# Patient Record
Sex: FEMALE | Race: WHITE | URBAN - METROPOLITAN AREA
[De-identification: names, ages, dates, MRNs, and addresses within clinical notes are randomized per-mention and may not be internally consistent; named-entity substitution may affect disease eponyms.]

---

## 2023-01-13 ENCOUNTER — HOSPITAL ENCOUNTER (OUTPATIENT)
Dept: PREADMISSION TESTING | Age: 83
Discharge: HOME | End: 2023-01-13
Payer: COMMERCIAL

## 2023-01-13 VITALS
OXYGEN SATURATION: 97 % | RESPIRATION RATE: 18 BRPM | DIASTOLIC BLOOD PRESSURE: 67 MMHG | BODY MASS INDEX: 23.3 KG/M2 | HEART RATE: 77 BPM | WEIGHT: 145 LBS | TEMPERATURE: 97.8 F | HEIGHT: 66 IN | SYSTOLIC BLOOD PRESSURE: 139 MMHG

## 2023-01-13 LAB
ABSOLUTE EOS #: 0.1 K/UL (ref 0–0.44)
ABSOLUTE IMMATURE GRANULOCYTE: 0.02 K/UL (ref 0–0.3)
ABSOLUTE LYMPH #: 1.61 K/UL (ref 1.1–3.7)
ABSOLUTE MONO #: 0.55 K/UL (ref 0.1–1.2)
ANION GAP SERPL CALCULATED.3IONS-SCNC: 11 MMOL/L (ref 9–17)
BASOPHILS # BLD: 1 % (ref 0–2)
BASOPHILS ABSOLUTE: 0.05 K/UL (ref 0–0.2)
BILIRUBIN URINE: NEGATIVE
BUN BLDV-MCNC: 20 MG/DL (ref 8–23)
BUN/CREAT BLD: 23 (ref 9–20)
CALCIUM SERPL-MCNC: 9.6 MG/DL (ref 8.6–10.4)
CHLORIDE BLD-SCNC: 104 MMOL/L (ref 98–107)
CO2: 26 MMOL/L (ref 20–31)
COLOR: YELLOW
COMMENT UA: NORMAL
CREAT SERPL-MCNC: 0.88 MG/DL (ref 0.5–0.9)
EOSINOPHILS RELATIVE PERCENT: 1 % (ref 1–4)
GFR SERPL CREATININE-BSD FRML MDRD: >60 ML/MIN/1.73M2
GLUCOSE BLD-MCNC: 86 MG/DL (ref 70–99)
GLUCOSE URINE: NEGATIVE
HCT VFR BLD CALC: 43.4 % (ref 36.3–47.1)
HEMOGLOBIN: 13.6 G/DL (ref 11.9–15.1)
IMMATURE GRANULOCYTES: 0 %
INR BLD: 1
KETONES, URINE: NEGATIVE
LEUKOCYTE ESTERASE, URINE: NEGATIVE
LYMPHOCYTES # BLD: 22 % (ref 24–43)
MCH RBC QN AUTO: 33.7 PG (ref 25.2–33.5)
MCHC RBC AUTO-ENTMCNC: 31.3 G/DL (ref 28.4–34.8)
MCV RBC AUTO: 107.7 FL (ref 82.6–102.9)
MONOCYTES # BLD: 8 % (ref 3–12)
NITRITE, URINE: NEGATIVE
NRBC AUTOMATED: 0 PER 100 WBC
PARTIAL THROMBOPLASTIN TIME: 26.7 SEC (ref 23.9–33.8)
PDW BLD-RTO: 13.7 % (ref 11.8–14.4)
PH UA: 6 (ref 5–8)
PLATELET # BLD: 198 K/UL (ref 138–453)
PMV BLD AUTO: 10.2 FL (ref 8.1–13.5)
POTASSIUM SERPL-SCNC: 3.9 MMOL/L (ref 3.7–5.3)
PROTEIN UA: NEGATIVE
PROTHROMBIN TIME: 12.7 SEC (ref 11.5–14.2)
RBC # BLD: 4.03 M/UL (ref 3.95–5.11)
RBC # BLD: ABNORMAL 10*6/UL
SEG NEUTROPHILS: 68 % (ref 36–65)
SEGMENTED NEUTROPHILS ABSOLUTE COUNT: 4.94 K/UL (ref 1.5–8.1)
SODIUM BLD-SCNC: 141 MMOL/L (ref 135–144)
SPECIFIC GRAVITY UA: 1.02 (ref 1–1.03)
TURBIDITY: CLEAR
URINE HGB: NEGATIVE
UROBILINOGEN, URINE: NORMAL
WBC # BLD: 7.3 K/UL (ref 3.5–11.3)

## 2023-01-13 PROCEDURE — 80048 BASIC METABOLIC PNL TOTAL CA: CPT

## 2023-01-13 PROCEDURE — 81003 URINALYSIS AUTO W/O SCOPE: CPT

## 2023-01-13 PROCEDURE — 85730 THROMBOPLASTIN TIME PARTIAL: CPT

## 2023-01-13 PROCEDURE — 87086 URINE CULTURE/COLONY COUNT: CPT

## 2023-01-13 PROCEDURE — 85610 PROTHROMBIN TIME: CPT

## 2023-01-13 PROCEDURE — 85025 COMPLETE CBC W/AUTO DIFF WBC: CPT

## 2023-01-13 PROCEDURE — 36415 COLL VENOUS BLD VENIPUNCTURE: CPT

## 2023-01-13 RX ORDER — FLUTICASONE FUROATE AND VILANTEROL 100; 25 UG/1; UG/1
1 POWDER RESPIRATORY (INHALATION) DAILY
COMMUNITY
Start: 2020-06-03

## 2023-01-13 RX ORDER — MONTELUKAST SODIUM 10 MG/1
10 TABLET ORAL NIGHTLY
COMMUNITY
Start: 2021-05-19

## 2023-01-13 RX ORDER — ASPIRIN 81 MG/1
81 TABLET ORAL DAILY
COMMUNITY
Start: 2023-01-04

## 2023-01-13 RX ORDER — ACETAMINOPHEN 500 MG
500 TABLET ORAL EVERY 6 HOURS PRN
COMMUNITY

## 2023-01-13 RX ORDER — FOLIC ACID 1 MG/1
1 TABLET ORAL DAILY
COMMUNITY

## 2023-01-13 RX ORDER — ATORVASTATIN CALCIUM 10 MG/1
10 TABLET, FILM COATED ORAL DAILY
COMMUNITY
Start: 2023-01-04

## 2023-01-13 RX ORDER — FLUTICASONE PROPIONATE 50 MCG
2 SPRAY, SUSPENSION (ML) NASAL DAILY
COMMUNITY
Start: 2022-11-07

## 2023-01-13 RX ORDER — ALBUTEROL SULFATE 90 UG/1
2 AEROSOL, METERED RESPIRATORY (INHALATION) EVERY 4 HOURS PRN
COMMUNITY

## 2023-01-13 RX ORDER — HYDROXYCHLOROQUINE SULFATE 200 MG/1
200 TABLET, FILM COATED ORAL DAILY
COMMUNITY
Start: 2020-01-27

## 2023-01-13 RX ORDER — SERTRALINE HYDROCHLORIDE 100 MG/1
100 TABLET, FILM COATED ORAL DAILY
COMMUNITY
Start: 2020-05-19

## 2023-01-13 RX ORDER — METOPROLOL SUCCINATE 25 MG/1
25 TABLET, EXTENDED RELEASE ORAL DAILY
COMMUNITY

## 2023-01-13 ASSESSMENT — PAIN DESCRIPTION - LOCATION: LOCATION: BACK

## 2023-01-13 ASSESSMENT — PAIN SCALES - GENERAL: PAINLEVEL_OUTOF10: 0

## 2023-01-13 ASSESSMENT — PAIN DESCRIPTION - FREQUENCY: FREQUENCY: INTERMITTENT

## 2023-01-13 ASSESSMENT — PAIN DESCRIPTION - DESCRIPTORS: DESCRIPTORS: ACHING

## 2023-01-13 ASSESSMENT — PAIN DESCRIPTION - PAIN TYPE: TYPE: CHRONIC PAIN

## 2023-01-13 NOTE — PROGRESS NOTES
PAT Progress Note    Pt Name: Karina Sanchez  MRN: [id number]  YOB: 1940  Date of evaluation: 1/13/2023      [x] Called to [facility]. I spoke to the patient, Karina Sanchez, a 78 y.o. female, who is scheduled for an upcoming L 3-5 [address] L3-4 by Coni Alarcon MD for Spinal stenosis of lumbar region, unspecified whether neurogenic claudication present [M48.061] on 2/3/2023 at 1005. [x] I reviewed in [facility] the primary care progress note by Dr. Amado Mt dated 1/9/2023 for an Interval History and Physical Note the day of surgery. History of hypertension, asthma, memory loss. Patient was treated for UTI in December 2022 and developed jaw and neck pain. She was admitted to [facility] and had stress test which was low risk and 2D echo. Patient had outpatient follow up with cardiology on 1/3/2023 - cardiology aware of upcoming surgery with Dr. Bishnu Stewart. Patient has known dilated ascending aorta and was recommended repeat imaging in 1 year. She was additionally evaluated by neurology for memory loss and primary care. Patient follows with pulmonary and evaluated 11/7/2022. Functional Capacity per pt:  1) Pt is able to walk 2 city blocks on level ground without SOB. 2) Pt is able to climb 2 flights of stairs without SOB. 3) Pt is able to walk up a hill for 1-2 city blocks without SOB. Echo complete W/ contrast 12/23/2022:    Left Ventricle: Left ventricle is small. There is mild increased wall thickness/hypertrophy. Systolic function is normal with an ejection fraction of 65-70%. No segmental wall motion abnormalities. Right Ventricle: Right ventricular size appears normal. Systolic function is normal.     Left Atrium: Left atrium is normal in size. The left atrial volume index is 26.0 mL/m2. Aorta: The ascending aorta is mildly dilated, measuring 4.1 cm. No significant valvular stenosis or regurgitation. Nuclear stress lexiscan 12/23/2022:     The Lexiscan ECG was negative for ischemia. Nuclear stress images reported separately   IMPRESSION:   Pattern is considered low risk for significant cardiovascular event   No large or suspicious reversible perfusion defects are seen   Nondilated left ventricle with ejection fraction 62%   Asymmetric nodular confluent soft tissue density in the inferior lateral aspect of the right greater than left breast measuring up to around 25 mm. Correlate for any corresponding mammographic or palpable abnormality. Diagnostic breast imaging or ultrasound might be useful. Vital signs: /67   Pulse 77   Temp 97.8 °F (36.6 °C) (Temporal)   Resp 18   Ht 5' 6\" (1.676 m)   Wt 145 lb (65.8 kg)   SpO2 97%   BMI 23.40 kg/m²     Physical Exam:     General Appearance:  Alert, well appearing, and in no acute distress. Mental status: Poor historian. Oriented to person, place, and time. Lungs:  Bilateral equal air entry, clear to auscultation, no wheezing, rales or rhonchi, and normal effort. Cardiovascular:  Normal rate, regular rhythm, no murmur, gallop, or rub. Investigations:      Laboratory Testing:  Recent Results (from the past 24 hour(s))   Urinalysis    Collection Time: 01/13/23 11:16 AM   Result Value Ref Range    Color, UA Yellow Yellow    Turbidity UA Clear Clear    Glucose, Ur NEGATIVE NEGATIVE    Bilirubin Urine NEGATIVE NEGATIVE    Ketones, Urine NEGATIVE NEGATIVE    Specific Gravity, UA 1.025 1.005 - 1.030    Urine Hgb NEGATIVE NEGATIVE    pH, UA 6.0 5.0 - 8.0    Protein, UA NEGATIVE NEGATIVE    Urobilinogen, Urine Normal Normal    Nitrite, Urine NEGATIVE NEGATIVE    Leukocyte Esterase, Urine NEGATIVE NEGATIVE    Urinalysis Comments       Microscopic exam not performed based on chemical results unless requested in original order.    CBC with Auto Differential    Collection Time: 01/13/23 11:18 AM   Result Value Ref Range    WBC 7.3 3.5 - 11.3 k/uL    RBC 4.03 3.95 - 5.11 m/uL    Hemoglobin 13.6 11.9 - 15.1 g/dL    Hematocrit 43.4 36.3 - 47.1 %    .7 (H) 82.6 - 102.9 fL    MCH 33.7 (H) 25.2 - 33.5 pg    MCHC 31.3 28.4 - 34.8 g/dL    RDW 13.7 11.8 - 14.4 %    Platelets 263 256 - 665 k/uL    MPV 10.2 8.1 - 13.5 fL    NRBC Automated 0.0 0.0 per 100 WBC    Seg Neutrophils 68 (H) 36 - 65 %    Lymphocytes 22 (L) 24 - 43 %    Monocytes 8 3 - 12 %    Eosinophils % 1 1 - 4 %    Basophils 1 0 - 2 %    Immature Granulocytes 0 0 %    Segs Absolute 4.94 1.50 - 8.10 k/uL    Absolute Lymph # 1.61 1.10 - 3.70 k/uL    Absolute Mono # 0.55 0.10 - 1.20 k/uL    Absolute Eos # 0.10 0.00 - 0.44 k/uL    Basophils Absolute 0.05 0.00 - 0.20 k/uL    Absolute Immature Granulocyte 0.02 0.00 - 0.30 k/uL    RBC Morphology MACROCYTOSIS PRESENT    Protime-INR    Collection Time: 01/13/23 11:18 AM   Result Value Ref Range    Protime 12.7 11.5 - 14.2 sec    INR 1.0    APTT    Collection Time: 01/13/23 11:18 AM   Result Value Ref Range    PTT 26.7 23.9 - 33.8 sec   Basic Metabolic Panel    Collection Time: 01/13/23 11:18 AM   Result Value Ref Range    Glucose 86 70 - 99 mg/dL    BUN 20 8 - 23 mg/dL    Creatinine 0.88 0.50 - 0.90 mg/dL    Est, Glom Filt Rate >60 >60 mL/min/1.73m2    Bun/Cre Ratio 23 (H) 9 - 20    Calcium 9.6 8.6 - 10.4 mg/dL    Sodium 141 135 - 144 mmol/L    Potassium 3.9 3.7 - 5.3 mmol/L    Chloride 104 98 - 107 mmol/L    CO2 26 20 - 31 mmol/L    Anion Gap 11 9 - 17 mmol/L       Recent Labs     01/13/23  1118   HGB 13.6   HCT 43.4   WBC 7.3   .7*      K 3.9      CO2 26   BUN 20   CREATININE 0.88   GLUCOSE 86   INR 1.0   PROTIME 12.7   APTT 26.7       No results for input(s): COVID19 in the last 720 hours.     EMANUEL San - CNP    Electronically signed 1/13/2023 at 12:24 PM

## 2023-01-13 NOTE — PRE-PROCEDURE INSTRUCTIONS
[facility] ON Friday, February 3  at 0800 AM    Once you enter the hospital lobby, take the elevators to the second floor. Check-In is at the surgery registration desk. Continue to take your home medications as you normally do up to and including the night before surgery with the exception of any blood thinning medications. Please stop any blood thinning medications as directed by your surgeon or prescribing physician. Failure to stop certain medications may interfere with your scheduled surgery. These may include:  Aspirin, Warfarin (Coumadin), Clopidogrel (Plavix), Ibuprofen (Motrin, Advil), Naproxen (Aleve), Meloxicam (Mobic), Celecoxib (Celebrex), Eliquis, Pradaxa, Xarelto, Effient, Fish Oil, Herbal supplements. Stop aspirin as directed by physician 5 days prior to surgery,  Stop methotrexate 1/24,     Please take the following medication(s) the day of surgery with a small sip of water:  Breo inhaler, sertraline, metorprolol,     Please use your inhaler(s) if needed and bring your inhaler(s) from home the day of surgery. PREPARING FOR YOUR SURGERY:     Before surgery, you can play an important role in your own health. Because skin is not sterile, we need to be sure that your skin is as free of germs as possible before surgery by carefully washing before surgery. Preparing or prepping skin before surgery can reduce the risk of a surgical site infection.   Do not shave the area of your body where your surgery will be performed unless you received specific permission from your physician. You will need to shower at home the night before surgery and the morning of surgery with a special soap called chlorhexidine gluconate (CHG*). *Not to be used by people allergic to Chlorhexidine Gluconate (CHG). Following these instructions will help you be sure that your skin is clean before surgery. Instructions on cleaning your skin before surgery:      The night before your surgery: You will need to shower with warm water (not hot) and the CHG soap. Use a clean wash cloth and a clean towel. Have clean clothes available to put on after the shower. First wash your hair with regular shampoo. Rinse your hair and body thoroughly to remove the shampoo. Wash your face and genital area (private parts) with your regular soap or water only. Thoroughly rinse your body with warm water from the neck down. Turn water off to prevent rinsing the soap off too soon. With a clean wet washcloth and half of the CHG soap in the bottle, lather your entire body from the neck down. Do not use CHG soap near your eyes or ears to avoid injury to those areas. Wash thoroughly, paying special attention to the area where your surgery will be performed. Wash your body gently for five (5) minutes. Avoid scrubbing your skin too hard. Turn the water back on and rinse your body thoroughly. Pat yourself dry with a clean, soft towel. Do not apply lotion, cream or powder. Dress with clean freshly washed clothes. The morning of surgery:    Repeat shower following steps above - using remaining half of CHG soap in bottle. Patient Instructions: If you are having any type of anesthesia you are to have nothing to eat or drink after midnight the night before your surgery. This includes gum, hard candy, mints, water or smoking or chewing tobacco.  The only exception to this is a small sip of water to take with any morning dose of heart, blood pressure, or seizure medications. No alcoholic beverages for 24 hours prior to surgery. Brush your teeth but do not swallow water. Bring your eye glasses and case with you. No contacts are to be worn the day of surgery. You also may bring your hearing aids. Most surgical procedures involving anesthesia will require that you remove your dentures prior to surgery. Do not wear any jewelry or body piercings day of surgery.   Also, NO lotion, perfume or deodorant to be used the day of surgery. No nail polish on the operative extremity (arm/leg surgeries)    If you are staying overnight with us, please bring a small bag of necessary personal items. Please wear loose, comfortable clothing. If you are potentially going to have a cast or brace bring clothing that will fit over them. In case of illness - If you have cold or flu like symptoms (high fever, runny nose, sore throat, cough, etc.) rash, nausea, vomiting, loose stools, and/or recent contact with someone who has a contagious disease (chicken pox, measles, etc.) Please call your doctor before coming to the hospital.         Day of Surgery/Procedure:    As a patient at [facility] you can expect quality medical and nursing care that is centered on your individual needs. Our goal is to make your surgical experience as comfortable as possible    . Transportation After Your Surgery/Procedure: You will need a friend or family member to drive you home after your procedure. Your  must be 25years of age or older and able to sign off on your discharge instructions. A taxi cab or any other form of public transportation is not acceptable. Your friend or family member must stay at the hospital throughout your procedure. Someone must remain with you for the first 24 hours after your surgery if you receive anesthesia or medication. If you do not have someone to stay with you, your procedure may be cancelled.       If you have any other questions regarding your procedure or the day of surgery, please call [phone]      _________________________  ____________________________  Signature (Patient)              Signature (Provider)               Date

## 2023-01-14 LAB
CULTURE: NORMAL
SPECIMEN DESCRIPTION: NORMAL

## 2023-01-16 ENCOUNTER — ANESTHESIA EVENT (OUTPATIENT)
Dept: OPERATING ROOM | Age: 83
End: 2023-01-16
Payer: MEDICARE

## 2023-01-16 RX ORDER — CLINDAMYCIN PHOSPHATE 900 MG/50ML
900 INJECTION INTRAVENOUS ONCE
OUTPATIENT
Start: 2023-02-03

## 2023-02-02 NOTE — DISCHARGE INSTRUCTIONS
No bending or twisting back for 6 weeks  No lifting over 15 pounds for 6 weeks  Dry dressing changes daily x 1wk. Start in 2 days  No NSAIDS x 5 days  Stiches are dissolvable and do not need to be removed  Call for any fevers, wound redness, swelling or drainage after 4 days [phone]  May shower in 2 days  No tub baths for 6 weeks  Resume methotrexate in 2 weeks    Chon Robert MD  [facility]  Spine Surgeon       Keep it Clean - Post-Operative Home instructions    These instructions are to help you have the best possible recovery after your surgical procedure. Franko De La Garza is here to support you. If you have questions, call [phone] Monday through Friday from 7:30AM to 8:30PM to speak to a nurse. If you need to speak to someone outside of these hours, call your physician. Incision Dos and Donts  Do wash hands before and after dressing changes or when you have had any contact with your incision. Use hand  or antibacterial soap. Do keep your incision clean and dry. Its OK to wash the skin around your incision with mild soap and water. Do change your dressing as you were told. Do notify your doctor if the dressing becomes wet or dirty. Do use a clean washcloth every time when cleaning your incision. Do sleep on clean linens. Do keep pets away from incision site. Dont sit in a bathtub, pool, or hot tub until your incision is fully closed and any drains are removed. Dont scrub, pick, scratch, or pull at your incision. Dont use oils, lotions, or creams on your incision unless your healthcare provider approves it. Follow-up  You will have one or more follow-up visits with your healthcare provider. These are needed to check how well youre healing. Your drain, stitches, or staples may also be removed during these visits. Do not miss your follow-up visit, even if you are feeling better.       Call your healthcare provider right away if you have the following:  Fever of 100.4°F (38°C) or higher, or as advised by your healthcare provider. Chest pain or trouble breathing. Pain or tenderness in your leg(s). Increased pain, redness, swelling, bleeding, or foul-smelling drainage at the incision site. Incision changes, separates or is hot to the touch. Problems with the drain if you have one. Itchy, swollen skin; skin rash.   Daily dressing change for 1 week; begin 2-6-23  You may shower starting 2-6-23; no bath or swimming     Medicines, Diet, and Activity:   Refer to your discharge paperwork for further instructions

## 2023-02-03 ENCOUNTER — HOSPITAL ENCOUNTER (OUTPATIENT)
Age: 83
Discharge: HOME | End: 2023-02-04
Attending: ORTHOPAEDIC SURGERY | Admitting: ORTHOPAEDIC SURGERY
Payer: MEDICARE

## 2023-02-03 ENCOUNTER — ANESTHESIA (OUTPATIENT)
Dept: OPERATING ROOM | Age: 83
End: 2023-02-03
Payer: MEDICARE

## 2023-02-03 ENCOUNTER — APPOINTMENT (OUTPATIENT)
Dept: GENERAL RADIOLOGY | Age: 83
End: 2023-02-03
Attending: ORTHOPAEDIC SURGERY
Payer: MEDICARE

## 2023-02-03 DIAGNOSIS — G89.4 CHRONIC PAIN SYNDROME: ICD-10-CM

## 2023-02-03 DIAGNOSIS — M48.062 LUMBAR STENOSIS WITH NEUROGENIC CLAUDICATION: Primary | Chronic | ICD-10-CM

## 2023-02-03 PROBLEM — N18.9 CKD (CHRONIC KIDNEY DISEASE): Status: ACTIVE | Noted: 2023-02-03

## 2023-02-03 PROBLEM — I10 HYPERTENSION: Status: ACTIVE | Noted: 2023-02-03

## 2023-02-03 PROBLEM — J44.9 COPD (CHRONIC OBSTRUCTIVE PULMONARY DISEASE) (HCC): Status: ACTIVE | Noted: 2023-02-03

## 2023-02-03 PROBLEM — Z96.9 RETAINED ORTHOPEDIC HARDWARE: Chronic | Status: ACTIVE | Noted: 2023-02-03

## 2023-02-03 PROBLEM — E78.5 HYPERLIPIDEMIA: Status: ACTIVE | Noted: 2023-02-03

## 2023-02-03 PROCEDURE — 6360000002 HC RX W HCPCS: Performed by: ANESTHESIOLOGY

## 2023-02-03 PROCEDURE — 2500000003 HC RX 250 WO HCPCS: Performed by: NURSE ANESTHETIST, CERTIFIED REGISTERED

## 2023-02-03 PROCEDURE — 2580000003 HC RX 258: Performed by: ORTHOPAEDIC SURGERY

## 2023-02-03 PROCEDURE — 2720000010 HC SURG SUPPLY STERILE: Performed by: ORTHOPAEDIC SURGERY

## 2023-02-03 PROCEDURE — 2709999900 HC NON-CHARGEABLE SUPPLY: Performed by: ORTHOPAEDIC SURGERY

## 2023-02-03 PROCEDURE — 6360000002 HC RX W HCPCS: Performed by: ORTHOPAEDIC SURGERY

## 2023-02-03 PROCEDURE — 6370000000 HC RX 637 (ALT 250 FOR IP): Performed by: ANESTHESIOLOGY

## 2023-02-03 PROCEDURE — 6360000002 HC RX W HCPCS: Performed by: NURSE ANESTHETIST, CERTIFIED REGISTERED

## 2023-02-03 PROCEDURE — 7100000001 HC PACU RECOVERY - ADDTL 15 MIN: Performed by: ORTHOPAEDIC SURGERY

## 2023-02-03 PROCEDURE — 6370000000 HC RX 637 (ALT 250 FOR IP): Performed by: ORTHOPAEDIC SURGERY

## 2023-02-03 PROCEDURE — 2500000003 HC RX 250 WO HCPCS: Performed by: ORTHOPAEDIC SURGERY

## 2023-02-03 PROCEDURE — 3209999900 FLUORO FOR SURGICAL PROCEDURES

## 2023-02-03 PROCEDURE — 99221 1ST HOSP IP/OBS SF/LOW 40: CPT | Performed by: NURSE PRACTITIONER

## 2023-02-03 PROCEDURE — 2580000003 HC RX 258: Performed by: ANESTHESIOLOGY

## 2023-02-03 PROCEDURE — A4217 STERILE WATER/SALINE, 500 ML: HCPCS | Performed by: ORTHOPAEDIC SURGERY

## 2023-02-03 PROCEDURE — 3600000012 HC SURGERY LEVEL 2 ADDTL 15MIN: Performed by: ORTHOPAEDIC SURGERY

## 2023-02-03 PROCEDURE — 3700000001 HC ADD 15 MINUTES (ANESTHESIA): Performed by: ORTHOPAEDIC SURGERY

## 2023-02-03 PROCEDURE — 7100000000 HC PACU RECOVERY - FIRST 15 MIN: Performed by: ORTHOPAEDIC SURGERY

## 2023-02-03 PROCEDURE — 3600000002 HC SURGERY LEVEL 2 BASE: Performed by: ORTHOPAEDIC SURGERY

## 2023-02-03 PROCEDURE — 3700000000 HC ANESTHESIA ATTENDED CARE: Performed by: ORTHOPAEDIC SURGERY

## 2023-02-03 RX ORDER — SODIUM CHLORIDE 9 MG/ML
INJECTION, SOLUTION INTRAVENOUS PRN
Status: DISCONTINUED | OUTPATIENT
Start: 2023-02-03 | End: 2023-02-03 | Stop reason: HOSPADM

## 2023-02-03 RX ORDER — CLINDAMYCIN PHOSPHATE 900 MG/50ML
900 INJECTION INTRAVENOUS ONCE
Status: COMPLETED | OUTPATIENT
Start: 2023-02-03 | End: 2023-02-03

## 2023-02-03 RX ORDER — DEXAMETHASONE SODIUM PHOSPHATE 10 MG/ML
6 INJECTION, SOLUTION INTRAMUSCULAR; INTRAVENOUS EVERY 8 HOURS
Status: COMPLETED | OUTPATIENT
Start: 2023-02-03 | End: 2023-02-04

## 2023-02-03 RX ORDER — BUPIVACAINE HYDROCHLORIDE AND EPINEPHRINE 5; 5 MG/ML; UG/ML
INJECTION, SOLUTION EPIDURAL; INTRACAUDAL; PERINEURAL PRN
Status: DISCONTINUED | OUTPATIENT
Start: 2023-02-03 | End: 2023-02-03 | Stop reason: ALTCHOICE

## 2023-02-03 RX ORDER — ONDANSETRON 2 MG/ML
INJECTION INTRAMUSCULAR; INTRAVENOUS PRN
Status: DISCONTINUED | OUTPATIENT
Start: 2023-02-03 | End: 2023-02-03 | Stop reason: SDUPTHER

## 2023-02-03 RX ORDER — FENTANYL CITRATE 50 UG/ML
INJECTION, SOLUTION INTRAMUSCULAR; INTRAVENOUS PRN
Status: DISCONTINUED | OUTPATIENT
Start: 2023-02-03 | End: 2023-02-03 | Stop reason: SDUPTHER

## 2023-02-03 RX ORDER — GLYCOPYRROLATE 0.2 MG/ML
INJECTION INTRAMUSCULAR; INTRAVENOUS PRN
Status: DISCONTINUED | OUTPATIENT
Start: 2023-02-03 | End: 2023-02-03 | Stop reason: SDUPTHER

## 2023-02-03 RX ORDER — HYDROCODONE BITARTRATE AND ACETAMINOPHEN 5; 325 MG/1; MG/1
2 TABLET ORAL EVERY 4 HOURS PRN
Status: DISCONTINUED | OUTPATIENT
Start: 2023-02-03 | End: 2023-02-04 | Stop reason: HOSPADM

## 2023-02-03 RX ORDER — MORPHINE SULFATE 4 MG/ML
4 INJECTION, SOLUTION INTRAMUSCULAR; INTRAVENOUS
Status: DISCONTINUED | OUTPATIENT
Start: 2023-02-03 | End: 2023-02-04 | Stop reason: HOSPADM

## 2023-02-03 RX ORDER — ROCURONIUM BROMIDE 10 MG/ML
INJECTION, SOLUTION INTRAVENOUS PRN
Status: DISCONTINUED | OUTPATIENT
Start: 2023-02-03 | End: 2023-02-03 | Stop reason: SDUPTHER

## 2023-02-03 RX ORDER — FENTANYL CITRATE 50 UG/ML
25 INJECTION, SOLUTION INTRAMUSCULAR; INTRAVENOUS EVERY 5 MIN PRN
Status: DISCONTINUED | OUTPATIENT
Start: 2023-02-03 | End: 2023-02-03 | Stop reason: HOSPADM

## 2023-02-03 RX ORDER — SODIUM CHLORIDE 0.9 % (FLUSH) 0.9 %
5-40 SYRINGE (ML) INJECTION PRN
Status: DISCONTINUED | OUTPATIENT
Start: 2023-02-03 | End: 2023-02-03 | Stop reason: HOSPADM

## 2023-02-03 RX ORDER — METOPROLOL SUCCINATE 25 MG/1
25 TABLET, EXTENDED RELEASE ORAL DAILY
Status: DISCONTINUED | OUTPATIENT
Start: 2023-02-03 | End: 2023-02-03 | Stop reason: CLARIF

## 2023-02-03 RX ORDER — HYDROXYCHLOROQUINE SULFATE 200 MG/1
200 TABLET, FILM COATED ORAL DAILY
Status: DISCONTINUED | OUTPATIENT
Start: 2023-02-03 | End: 2023-02-04 | Stop reason: HOSPADM

## 2023-02-03 RX ORDER — SODIUM CHLORIDE 9 MG/ML
INJECTION, SOLUTION INTRAVENOUS CONTINUOUS
Status: DISCONTINUED | OUTPATIENT
Start: 2023-02-03 | End: 2023-02-04 | Stop reason: HOSPADM

## 2023-02-03 RX ORDER — FLUTICASONE PROPIONATE 50 MCG
2 SPRAY, SUSPENSION (ML) NASAL DAILY
Status: DISCONTINUED | OUTPATIENT
Start: 2023-02-03 | End: 2023-02-04 | Stop reason: HOSPADM

## 2023-02-03 RX ORDER — FLUTICASONE FUROATE AND VILANTEROL 100; 25 UG/1; UG/1
1 POWDER RESPIRATORY (INHALATION) DAILY
Status: DISCONTINUED | OUTPATIENT
Start: 2023-02-03 | End: 2023-02-04 | Stop reason: HOSPADM

## 2023-02-03 RX ORDER — LIDOCAINE HYDROCHLORIDE 10 MG/ML
1 INJECTION, SOLUTION EPIDURAL; INFILTRATION; INTRACAUDAL; PERINEURAL
Status: DISCONTINUED | OUTPATIENT
Start: 2023-02-03 | End: 2023-02-03 | Stop reason: HOSPADM

## 2023-02-03 RX ORDER — ATORVASTATIN CALCIUM 10 MG/1
10 TABLET, FILM COATED ORAL DAILY
Status: DISCONTINUED | OUTPATIENT
Start: 2023-02-03 | End: 2023-02-04 | Stop reason: HOSPADM

## 2023-02-03 RX ORDER — MONTELUKAST SODIUM 10 MG/1
10 TABLET ORAL NIGHTLY
Status: DISCONTINUED | OUTPATIENT
Start: 2023-02-03 | End: 2023-02-04 | Stop reason: HOSPADM

## 2023-02-03 RX ORDER — SENNA AND DOCUSATE SODIUM 50; 8.6 MG/1; MG/1
1 TABLET, FILM COATED ORAL 2 TIMES DAILY
Status: DISCONTINUED | OUTPATIENT
Start: 2023-02-03 | End: 2023-02-04 | Stop reason: HOSPADM

## 2023-02-03 RX ORDER — ONDANSETRON 2 MG/ML
4 INJECTION INTRAMUSCULAR; INTRAVENOUS EVERY 6 HOURS PRN
Status: DISCONTINUED | OUTPATIENT
Start: 2023-02-03 | End: 2023-02-04

## 2023-02-03 RX ORDER — DEXAMETHASONE SODIUM PHOSPHATE 10 MG/ML
INJECTION, SOLUTION INTRAMUSCULAR; INTRAVENOUS PRN
Status: DISCONTINUED | OUTPATIENT
Start: 2023-02-03 | End: 2023-02-03 | Stop reason: SDUPTHER

## 2023-02-03 RX ORDER — SODIUM CHLORIDE 0.9 % (FLUSH) 0.9 %
5-40 SYRINGE (ML) INJECTION EVERY 12 HOURS SCHEDULED
Status: DISCONTINUED | OUTPATIENT
Start: 2023-02-03 | End: 2023-02-04 | Stop reason: HOSPADM

## 2023-02-03 RX ORDER — POLYETHYLENE GLYCOL 3350 17 G/17G
17 POWDER, FOR SOLUTION ORAL DAILY
Status: DISCONTINUED | OUTPATIENT
Start: 2023-02-03 | End: 2023-02-04 | Stop reason: HOSPADM

## 2023-02-03 RX ORDER — LIDOCAINE HYDROCHLORIDE 20 MG/ML
INJECTION, SOLUTION EPIDURAL; INFILTRATION; INTRACAUDAL; PERINEURAL PRN
Status: DISCONTINUED | OUTPATIENT
Start: 2023-02-03 | End: 2023-02-03 | Stop reason: SDUPTHER

## 2023-02-03 RX ORDER — HYDROCODONE BITARTRATE AND ACETAMINOPHEN 5; 325 MG/1; MG/1
1 TABLET ORAL EVERY 4 HOURS PRN
Status: DISCONTINUED | OUTPATIENT
Start: 2023-02-03 | End: 2023-02-04 | Stop reason: HOSPADM

## 2023-02-03 RX ORDER — SODIUM CHLORIDE 0.9 % (FLUSH) 0.9 %
5-40 SYRINGE (ML) INJECTION EVERY 12 HOURS SCHEDULED
Status: DISCONTINUED | OUTPATIENT
Start: 2023-02-03 | End: 2023-02-03 | Stop reason: HOSPADM

## 2023-02-03 RX ORDER — SODIUM CHLORIDE 0.9 % (FLUSH) 0.9 %
5-40 SYRINGE (ML) INJECTION PRN
Status: DISCONTINUED | OUTPATIENT
Start: 2023-02-03 | End: 2023-02-04 | Stop reason: HOSPADM

## 2023-02-03 RX ORDER — PROPOFOL 10 MG/ML
INJECTION, EMULSION INTRAVENOUS PRN
Status: DISCONTINUED | OUTPATIENT
Start: 2023-02-03 | End: 2023-02-03 | Stop reason: SDUPTHER

## 2023-02-03 RX ORDER — ALBUTEROL SULFATE 90 UG/1
2 AEROSOL, METERED RESPIRATORY (INHALATION) EVERY 4 HOURS PRN
Status: DISCONTINUED | OUTPATIENT
Start: 2023-02-03 | End: 2023-02-04 | Stop reason: HOSPADM

## 2023-02-03 RX ORDER — METHOCARBAMOL 750 MG/1
750 TABLET, FILM COATED ORAL EVERY 8 HOURS PRN
Status: DISCONTINUED | OUTPATIENT
Start: 2023-02-03 | End: 2023-02-04 | Stop reason: HOSPADM

## 2023-02-03 RX ORDER — MORPHINE SULFATE 2 MG/ML
2 INJECTION, SOLUTION INTRAMUSCULAR; INTRAVENOUS
Status: DISCONTINUED | OUTPATIENT
Start: 2023-02-03 | End: 2023-02-04 | Stop reason: HOSPADM

## 2023-02-03 RX ORDER — HYDROXYZINE HYDROCHLORIDE 10 MG/1
10 TABLET, FILM COATED ORAL EVERY 8 HOURS PRN
Status: DISCONTINUED | OUTPATIENT
Start: 2023-02-03 | End: 2023-02-04 | Stop reason: HOSPADM

## 2023-02-03 RX ORDER — METOPROLOL SUCCINATE 50 MG/1
50 TABLET, EXTENDED RELEASE ORAL NIGHTLY
COMMUNITY

## 2023-02-03 RX ORDER — HYDROMORPHONE HYDROCHLORIDE 1 MG/ML
0.5 INJECTION, SOLUTION INTRAMUSCULAR; INTRAVENOUS; SUBCUTANEOUS EVERY 5 MIN PRN
Status: DISCONTINUED | OUTPATIENT
Start: 2023-02-03 | End: 2023-02-03 | Stop reason: HOSPADM

## 2023-02-03 RX ORDER — SODIUM CHLORIDE, SODIUM LACTATE, POTASSIUM CHLORIDE, CALCIUM CHLORIDE 600; 310; 30; 20 MG/100ML; MG/100ML; MG/100ML; MG/100ML
INJECTION, SOLUTION INTRAVENOUS CONTINUOUS
Status: DISCONTINUED | OUTPATIENT
Start: 2023-02-03 | End: 2023-02-03

## 2023-02-03 RX ORDER — PROMETHAZINE HYDROCHLORIDE 12.5 MG/1
12.5 TABLET ORAL EVERY 6 HOURS PRN
Status: DISCONTINUED | OUTPATIENT
Start: 2023-02-03 | End: 2023-02-04

## 2023-02-03 RX ORDER — SODIUM CHLORIDE 9 MG/ML
INJECTION, SOLUTION INTRAVENOUS PRN
Status: DISCONTINUED | OUTPATIENT
Start: 2023-02-03 | End: 2023-02-04 | Stop reason: HOSPADM

## 2023-02-03 RX ORDER — ONDANSETRON 2 MG/ML
4 INJECTION INTRAMUSCULAR; INTRAVENOUS
Status: DISCONTINUED | OUTPATIENT
Start: 2023-02-03 | End: 2023-02-03 | Stop reason: HOSPADM

## 2023-02-03 RX ORDER — SERTRALINE HYDROCHLORIDE 100 MG/1
100 TABLET, FILM COATED ORAL DAILY
Status: DISCONTINUED | OUTPATIENT
Start: 2023-02-03 | End: 2023-02-04 | Stop reason: HOSPADM

## 2023-02-03 RX ORDER — ACETAMINOPHEN 325 MG/1
650 TABLET ORAL ONCE
Status: COMPLETED | OUTPATIENT
Start: 2023-02-03 | End: 2023-02-03

## 2023-02-03 RX ORDER — SODIUM CHLORIDE 9 MG/ML
INJECTION, SOLUTION INTRAVENOUS CONTINUOUS
Status: DISCONTINUED | OUTPATIENT
Start: 2023-02-03 | End: 2023-02-03

## 2023-02-03 RX ORDER — METOPROLOL SUCCINATE 50 MG/1
50 TABLET, EXTENDED RELEASE ORAL DAILY
Status: DISCONTINUED | OUTPATIENT
Start: 2023-02-04 | End: 2023-02-04 | Stop reason: HOSPADM

## 2023-02-03 RX ORDER — PHENYLEPHRINE HCL IN 0.9% NACL 1 MG/10 ML
SYRINGE (ML) INTRAVENOUS PRN
Status: DISCONTINUED | OUTPATIENT
Start: 2023-02-03 | End: 2023-02-03 | Stop reason: SDUPTHER

## 2023-02-03 RX ADMIN — Medication 100 MCG: at 11:50

## 2023-02-03 RX ADMIN — FENTANYL CITRATE 25 MCG: 50 INJECTION INTRAMUSCULAR; INTRAVENOUS at 10:56

## 2023-02-03 RX ADMIN — Medication 100 MCG: at 11:22

## 2023-02-03 RX ADMIN — Medication 50 MCG: at 12:02

## 2023-02-03 RX ADMIN — ATORVASTATIN CALCIUM 10 MG: 10 TABLET, FILM COATED ORAL at 19:00

## 2023-02-03 RX ADMIN — LIDOCAINE HYDROCHLORIDE 50 MG: 20 INJECTION, SOLUTION EPIDURAL; INFILTRATION; INTRACAUDAL at 10:34

## 2023-02-03 RX ADMIN — HYDROCODONE BITARTRATE AND ACETAMINOPHEN 1 TABLET: 5; 325 TABLET ORAL at 16:42

## 2023-02-03 RX ADMIN — FENTANYL CITRATE 50 MCG: 50 INJECTION INTRAMUSCULAR; INTRAVENOUS at 10:34

## 2023-02-03 RX ADMIN — HYDROXYCHLOROQUINE SULFATE 200 MG: 200 TABLET, FILM COATED ORAL at 19:00

## 2023-02-03 RX ADMIN — DEXAMETHASONE SODIUM PHOSPHATE 6 MG: 10 INJECTION INTRAMUSCULAR; INTRAVENOUS at 19:05

## 2023-02-03 RX ADMIN — SODIUM CHLORIDE, POTASSIUM CHLORIDE, SODIUM LACTATE AND CALCIUM CHLORIDE: 600; 310; 30; 20 INJECTION, SOLUTION INTRAVENOUS at 12:24

## 2023-02-03 RX ADMIN — SENNOSIDES AND DOCUSATE SODIUM 1 TABLET: 50; 8.6 TABLET ORAL at 20:28

## 2023-02-03 RX ADMIN — FENTANYL CITRATE 25 MCG: 50 INJECTION, SOLUTION INTRAMUSCULAR; INTRAVENOUS at 13:38

## 2023-02-03 RX ADMIN — ROCURONIUM BROMIDE 10 MG: 10 SOLUTION INTRAVENOUS at 11:33

## 2023-02-03 RX ADMIN — MONTELUKAST SODIUM 10 MG: 10 TABLET ORAL at 20:28

## 2023-02-03 RX ADMIN — DEXAMETHASONE SODIUM PHOSPHATE 10 MG: 10 INJECTION, SOLUTION INTRAMUSCULAR; INTRAVENOUS at 10:53

## 2023-02-03 RX ADMIN — GLYCOPYRROLATE 0.3 MG: 0.2 INJECTION INTRAMUSCULAR; INTRAVENOUS at 11:19

## 2023-02-03 RX ADMIN — ROCURONIUM BROMIDE 40 MG: 10 SOLUTION INTRAVENOUS at 10:34

## 2023-02-03 RX ADMIN — CLINDAMYCIN PHOSPHATE 900 MG: 900 INJECTION, SOLUTION INTRAVENOUS at 10:51

## 2023-02-03 RX ADMIN — SUGAMMADEX 200 MG: 100 INJECTION, SOLUTION INTRAVENOUS at 12:39

## 2023-02-03 RX ADMIN — SODIUM CHLORIDE: 9 INJECTION, SOLUTION INTRAVENOUS at 16:12

## 2023-02-03 RX ADMIN — Medication 100 MCG: at 11:25

## 2023-02-03 RX ADMIN — SODIUM CHLORIDE, POTASSIUM CHLORIDE, SODIUM LACTATE AND CALCIUM CHLORIDE: 600; 310; 30; 20 INJECTION, SOLUTION INTRAVENOUS at 08:51

## 2023-02-03 RX ADMIN — POLYETHYLENE GLYCOL 3350 17 G: 17 POWDER, FOR SOLUTION ORAL at 19:05

## 2023-02-03 RX ADMIN — Medication 50 MCG: at 12:38

## 2023-02-03 RX ADMIN — ONDANSETRON 4 MG: 2 INJECTION INTRAMUSCULAR; INTRAVENOUS at 12:18

## 2023-02-03 RX ADMIN — HYDROCODONE BITARTRATE AND ACETAMINOPHEN 1 TABLET: 5; 325 TABLET ORAL at 20:28

## 2023-02-03 RX ADMIN — ACETAMINOPHEN 650 MG: 325 TABLET, FILM COATED ORAL at 09:53

## 2023-02-03 RX ADMIN — Medication 50 MCG: at 12:08

## 2023-02-03 RX ADMIN — PROPOFOL 100 MG: 10 INJECTION, EMULSION INTRAVENOUS at 10:34

## 2023-02-03 ASSESSMENT — PAIN SCALES - GENERAL
PAINLEVEL_OUTOF10: 0
PAINLEVEL_OUTOF10: 1
PAINLEVEL_OUTOF10: 4
PAINLEVEL_OUTOF10: 3
PAINLEVEL_OUTOF10: 4
PAINLEVEL_OUTOF10: 0

## 2023-02-03 ASSESSMENT — PAIN DESCRIPTION - PAIN TYPE
TYPE: SURGICAL PAIN
TYPE: SURGICAL PAIN

## 2023-02-03 ASSESSMENT — PAIN DESCRIPTION - LOCATION
LOCATION: BACK
LOCATION: BACK

## 2023-02-03 ASSESSMENT — PAIN DESCRIPTION - ORIENTATION
ORIENTATION: LOWER
ORIENTATION: MID;LOWER

## 2023-02-03 ASSESSMENT — PAIN DESCRIPTION - DESCRIPTORS: DESCRIPTORS: SORE

## 2023-02-03 ASSESSMENT — PAIN - FUNCTIONAL ASSESSMENT
PAIN_FUNCTIONAL_ASSESSMENT: 0-10
PAIN_FUNCTIONAL_ASSESSMENT: PREVENTS OR INTERFERES SOME ACTIVE ACTIVITIES AND ADLS

## 2023-02-03 ASSESSMENT — PAIN DESCRIPTION - FREQUENCY: FREQUENCY: CONTINUOUS

## 2023-02-03 ASSESSMENT — PAIN DESCRIPTION - ONSET: ONSET: ON-GOING

## 2023-02-03 NOTE — PROGRESS NOTES
Pt admitted to room 2003 from PACU. Oriented to room, call light and bed mechanics. Side rails up x2. Call light within reach. Orders reviewed.

## 2023-02-03 NOTE — ANESTHESIA PRE PROCEDURE
Department of Anesthesiology  Preprocedure Note       Name:  Diego Segundo   Age:  78 y.o.  :  1940                                          MRN:  [id number]         Date:  2/3/2023      Surgeon: Lazarus Gunn):  Luisa Palacios MD    Procedure: Procedure(s):  L 3-5 LUMBAR LAMINECTOMY   WITH REMOVAL OF VERTIFLEX L3-4    Medications prior to admission:   Prior to Admission medications    Medication Sig Start Date End Date Taking?  Authorizing Provider   acetaminophen (TYLENOL) 500 MG tablet Take 500 mg by mouth every 6 hours as needed  Patient not taking: Reported on 2/3/2023    Historical Provider, MD   albuterol sulfate HFA (PROVENTIL;VENTOLIN;PROAIR) 108 (90 Base) MCG/ACT inhaler Inhale 2 puffs into the lungs every 4 hours as needed    Historical Provider, MD   aspirin 81 MG EC tablet Take 81 mg by mouth daily 23   Historical Provider, MD   atorvastatin (LIPITOR) 10 MG tablet Take 10 mg by mouth daily 23   Historical Provider, MD   Fluticasone Furoate-Vilanterol 100-25 MCG/ACT AEPB Inhale 1 puff into the lungs daily 6/3/20   Historical Provider, MD   fluticasone (FLONASE) 50 MCG/ACT nasal spray 2 sprays by Nasal route daily 22   Historical Provider, MD   folic acid (FOLVITE) 1 MG tablet Take 1 mg by mouth daily    Historical Provider, MD   hydroxychloroquine (PLAQUENIL) 200 MG tablet Take 200 mg by mouth daily 20   Historical Provider, MD   methotrexate (RHEUMATREX) 2.5 MG chemo tablet Take 2.5 mg by mouth once a week 22   Historical Provider, MD   metoprolol succinate (TOPROL XL) 25 MG extended release tablet Take 25 mg by mouth daily    Historical Provider, MD   montelukast (SINGULAIR) 10 MG tablet Take 10 mg by mouth nightly 21   Historical Provider, MD   sertraline (ZOLOFT) 100 MG tablet Take 100 mg by mouth daily 20   Historical Provider, MD       Current medications:    Current Facility-Administered Medications   Medication Dose Route Frequency Provider Last Rate Last Admin    albuterol sulfate HFA (PROVENTIL;VENTOLIN;PROAIR) 108 (90 Base) MCG/ACT inhaler 2 puff  2 puff Inhalation Q4H PRN Ghanshyam You MD        atorvastatin (LIPITOR) tablet 10 mg  10 mg Oral Daily Ghanshyam You MD        fluticasone (FLONASE) 50 MCG/ACT nasal spray 2 spray  2 spray Nasal Daily Ghanshyam You MD        Fluticasone Furoate-Vilanterol 100-25 MCG/ACT AEPB 1 puff  1 puff Inhalation Daily Ghanshyam You MD        hydroxychloroquine (PLAQUENIL) tablet 200 mg  200 mg Oral Daily Ghanshyam You MD        metoprolol succinate (TOPROL XL) extended release tablet 25 mg  25 mg Oral Daily Ghanshyam You MD        montelukast (SINGULAIR) tablet 10 mg  10 mg Oral Nightly Ghanshyam You MD        sertraline (ZOLOFT) tablet 100 mg  100 mg Oral Daily Ghanshyam You MD        0.9 % sodium chloride infusion   IntraVENous Continuous Ghanshyam You  mL/hr at 02/03/23 1612 New Bag at 02/03/23 1612    sodium chloride flush 0.9 % injection 5-40 mL  5-40 mL IntraVENous 2 times per day Ghanshyam You MD        sodium chloride flush 0.9 % injection 5-40 mL  5-40 mL IntraVENous PRN Ghanshyam You MD        0.9 % sodium chloride infusion   IntraVENous PRN Ghanshyam You MD        morphine (PF) injection 2 mg  2 mg IntraVENous Q2H PRN Ghanshyam You MD        Or    morphine sulfate (PF) injection 4 mg  4 mg IntraVENous Q2H PRN Ghanshyam You MD        hydrOXYzine HCl (ATARAX) tablet 10 mg  10 mg Oral Q8H PRN Ghanshyam You MD        promethazine (PHENERGAN) tablet 12.5 mg  12.5 mg Oral Q6H PRN Ghanshyam You MD        Or    ondansetron (ZOFRAN) injection 4 mg  4 mg IntraVENous Q6H PRN Ghanshyam You MD        polyethylene glycol (GLYCOLAX) packet 17 g  17 g Oral Daily Ghanshyam You MD        sennosides-docusate sodium (SENOKOT-S) 8.6-50 MG tablet 1 tablet  1 tablet Oral BID Ghanshyam You MD        methocarbamol (ROBAXIN) tablet 750 mg  750 mg Oral Q8H PRN Ghanshyam You MD        HYDROcodone-acetaminophen (NORCO) 5-325 MG per tablet 1 tablet  1 tablet Oral Q4H PRN Wilma Hannon MD        HYDROcodone-acetaminophen (NORCO) 5-325 MG per tablet 2 tablet  2 tablet Oral Q4H PRN Wilma Hannon MD        dexamethasone (PF) (DECADRON) injection 6 mg  6 mg IntraVENous Q8H Wilma Hannon MD           Allergies:     Allergies   Allergen Reactions    Ciprofloxacin Other (See Comments)     Other reaction(s): tendon rupture  Other reaction(s): tendon rupture  Other reaction(s): Muscle Pain  Other reaction(s): tendon rupture achilles    Other reaction(s): Muscle Pain      Levofloxacin Other (See Comments)     Other reaction(s): tendon rupture  Other reaction(s): tendon rupture  Other reaction(s): tendon rupture achilles      Lisinopril Cough    Sulfamethoxazole-Trimethoprim Nausea And Vomiting    Cefazolin Hives     Other reaction(s): Hives         Problem List:    Patient Active Problem List   Diagnosis Code    Lumbar stenosis with neurogenic claudication M48.062    Retained orthopedic hardware Z96.9       Past Medical History:        Diagnosis Date    Arthritis     Cancer ([address]     skin removed    Chronic fatigue     CKD (chronic kidney disease)     COPD (chronic obstructive pulmonary disease) ([address]     DDD (degenerative disc disease), cervical     Fibromyalgia     Hyperlipidemia     Hypertension     Hypothyroid     no longer on medication    Insomnia     Memory loss     Prolonged emergence from general anesthesia     Rheumatoid arthritis ([address]     Spondylolisthesis     lumbar       Past Surgical History:        Procedure Laterality Date    CHOLECYSTECTOMY      COLOSTOMY      EYE SURGERY      cataracts    KNEE ARTHROPLASTY Bilateral     [place]    REVISION COLOSTOMY      TONSILLECTOMY         Social History:    Social History     Tobacco Use    Smoking status: Former     Types: Cigarettes     Quit date:      Years since quittin.1    Smokeless tobacco: Never   Substance Use Topics    Alcohol use: Yes     Comment: vodka on rocks 1 or 2 per day                                Counseling given: Not Answered      Vital Signs (Current):   Vitals:    02/03/23 1430 02/03/23 1500 02/03/23 1530 02/03/23 1557   BP: (!) 106/52 (!) 113/50 (!) 116/52 124/65   Pulse: 66 63 64 72   Resp: 11 13 11 18   Temp:   97.5 °F (36.4 °C) 98.1 °F (36.7 °C)   TempSrc:   Temporal Oral   SpO2: 92% 93% 90% 95%   Weight:                                                  BP Readings from Last 3 Encounters:   02/03/23 124/65   01/13/23 139/67       NPO Status: Time of last liquid consumption: 2300                        Time of last solid consumption: 1800                        Date of last liquid consumption: 02/02/23                        Date of last solid food consumption: 02/02/23    BMI:   Wt Readings from Last 3 Encounters:   02/03/23 145 lb (65.8 kg)   01/13/23 145 lb (65.8 kg)     Body mass index is 23.4 kg/m². CBC:   Lab Results   Component Value Date/Time    WBC 7.3 01/13/2023 11:18 AM    RBC 4.03 01/13/2023 11:18 AM    HGB 13.6 01/13/2023 11:18 AM    HCT 43.4 01/13/2023 11:18 AM    .7 01/13/2023 11:18 AM    RDW 13.7 01/13/2023 11:18 AM     01/13/2023 11:18 AM       CMP:   Lab Results   Component Value Date/Time     01/13/2023 11:18 AM    K 3.9 01/13/2023 11:18 AM     01/13/2023 11:18 AM    CO2 26 01/13/2023 11:18 AM    BUN 20 01/13/2023 11:18 AM    CREATININE 0.88 01/13/2023 11:18 AM    LABGLOM >60 01/13/2023 11:18 AM    GLUCOSE 86 01/13/2023 11:18 AM    CALCIUM 9.6 01/13/2023 11:18 AM       POC Tests: No results for input(s): POCGLU, POCNA, POCK, POCCL, POCBUN, POCHEMO, POCHCT in the last 72 hours.     Coags:   Lab Results   Component Value Date/Time    PROTIME 12.7 01/13/2023 11:18 AM    INR 1.0 01/13/2023 11:18 AM    APTT 26.7 01/13/2023 11:18 AM       HCG (If Applicable): No results found for: PREGTESTUR, PREGSERUM, HCG, HCGQUANT     ABGs: No results found for: PHART, PO2ART, GBI5KAZ, UBG8DDD, BEART, E8XNPBTI     Type & Screen (If Applicable):  No results found for: LABABO, LABRH    Drug/Infectious Status (If Applicable):  No results found for: HIV, HEPCAB    COVID-19 Screening (If Applicable): No results found for: COVID19        Anesthesia Evaluation  Patient summary reviewed and Nursing notes reviewed no history of anesthetic complications:   Airway: Mallampati: II  TM distance: >3 FB   Neck ROM: full  Mouth opening: > = 3 FB   Dental: normal exam         Pulmonary:   (+) COPD:                             Cardiovascular:  Exercise tolerance: no interval change,   (+) hypertension:,     (-) CAD and CABG/stent        Rate: normal                    Neuro/Psych:               GI/Hepatic/Renal:        (-) GERD       Endo/Other:    (+) hypothyroidism: arthritis:., .                 Abdominal:             Vascular: Other Findings:           Anesthesia Plan      general     ASA 3       Induction: intravenous. MIPS: Postoperative opioids intended and Prophylactic antiemetics administered. Anesthetic plan and risks discussed with patient. Plan discussed with CRNA.     Attending anesthesiologist reviewed and agrees with Preprocedure content                Lynette Ramirez DO   2/3/2023

## 2023-02-03 NOTE — H&P
Interval H&P Note    Pt Name: Sadia Watson  MRN: [id number]  YOB: 1940  Date of evaluation: 2/3/2023      [x] I have reviewed in [facility] the family medicine progress note by Dr. Moran Heading dated 01/09/2023, attached below for an Interval History and Physical note. [x] I have examined  Withams W Black  There are no changes to the patient who is scheduled for L 3-5 LUMBAR LAMINECTOMY   WITH REMOVAL OF VERTIFLEX L3-4 by Scarlet Porras MD for Spinal stenosis of lumbar region, unspecified whether neurogenic claudication present [M48.061]. The patient denies new health changes, fever, chills, wheezing, cough, increased SOB, chest pain, open sores or wounds. Denies hx of diabetes. Last aspirin 01/27/2023. Last Plaquenil 02/02/2023. Patient has history of hypertension as well as recent neck and jaw pain when hospitalized for a UTI in December 2022. She recently followed with cardiology on 01/03/2023. Imaging results below. Echo complete W/ contrast 12/23/2022:      Left Ventricle: Left ventricle is small. There is mild increased wall thickness/hypertrophy. Systolic function is normal with an ejection fraction of 65-70%. No segmental wall motion abnormalities. Right Ventricle: Right ventricular size appears normal. Systolic function is normal.     Left Atrium: Left atrium is normal in size. The left atrial volume index is 26.0 mL/m2. Aorta: The ascending aorta is mildly dilated, measuring 4.1 cm. No significant valvular stenosis or regurgitation. Nuclear stress lexiscan 12/23/2022: The Lexiscan ECG was negative for ischemia.      Nuclear stress images reported separately   IMPRESSION:   Pattern is considered low risk for significant cardiovascular event   No large or suspicious reversible perfusion defects are seen   Nondilated left ventricle with ejection fraction 62%   Asymmetric nodular confluent soft tissue density in the inferior lateral aspect of the right greater than left breast measuring up to around 25 mm. Correlate for any corresponding mammographic or palpable abnormality. Diagnostic breast imaging or ultrasound might be useful. Vital signs: /64   Pulse 70   Temp 97.5 °F (36.4 °C) (Temporal)   Resp 16   Wt 145 lb (65.8 kg)   SpO2 97%   BMI 23.40 kg/m²     Allergies:  Ciprofloxacin, Levofloxacin, Lisinopril, Sulfamethoxazole-trimethoprim, and Cefazolin    Medications:    Prior to Admission medications    Medication Sig Start Date End Date Taking?  Authorizing Provider   acetaminophen (TYLENOL) 500 MG tablet Take 500 mg by mouth every 6 hours as needed  Patient not taking: Reported on 2/3/2023    Historical Provider, MD   albuterol sulfate HFA (PROVENTIL;VENTOLIN;PROAIR) 108 (90 Base) MCG/ACT inhaler Inhale 2 puffs into the lungs every 4 hours as needed    Historical Provider, MD   aspirin 81 MG EC tablet Take 81 mg by mouth daily 1/4/23   Historical Provider, MD   atorvastatin (LIPITOR) 10 MG tablet Take 10 mg by mouth daily 1/4/23   Historical Provider, MD   Fluticasone Furoate-Vilanterol 100-25 MCG/ACT AEPB Inhale 1 puff into the lungs daily 6/3/20   Historical Provider, MD   fluticasone (FLONASE) 50 MCG/ACT nasal spray 2 sprays by Nasal route daily 11/7/22   Historical Provider, MD   folic acid (FOLVITE) 1 MG tablet Take 1 mg by mouth daily    Historical Provider, MD   hydroxychloroquine (PLAQUENIL) 200 MG tablet Take 200 mg by mouth daily 1/27/20   Historical Provider, MD   methotrexate (RHEUMATREX) 2.5 MG chemo tablet Take 2.5 mg by mouth once a week 9/20/22   Historical Provider, MD   metoprolol succinate (TOPROL XL) 25 MG extended release tablet Take 25 mg by mouth daily    Historical Provider, MD   montelukast (SINGULAIR) 10 MG tablet Take 10 mg by mouth nightly 5/19/21   Historical Provider, MD   sertraline (ZOLOFT) 100 MG tablet Take 100 mg by mouth daily 5/19/20   Historical Provider, MD         Past Medical History:     Past Medical History:   Diagnosis Date Arthritis     Cancer ([address]     skin removed    Chronic fatigue     CKD (chronic kidney disease)     COPD (chronic obstructive pulmonary disease) (HCC)     DDD (degenerative disc disease), cervical     Fibromyalgia     Hyperlipidemia     Hypertension     Hypothyroid     no longer on medication    Insomnia     Memory loss     Prolonged emergence from general anesthesia     Rheumatoid arthritis ([address]     Spondylolisthesis     lumbar        Past Surgical History:     Past Surgical History:   Procedure Laterality Date    CHOLECYSTECTOMY      COLOSTOMY      EYE SURGERY      cataracts    KNEE ARTHROPLASTY Bilateral     [place]    REVISION COLOSTOMY      TONSILLECTOMY         Social History:     Social History     Socioeconomic History    Marital status: Single     Spouse name: None    Number of children: None    Years of education: None    Highest education level: None   Tobacco Use    Smoking status: Former     Types: Cigarettes     Quit date:      Years since quittin.1    Smokeless tobacco: Never   Vaping Use    Vaping Use: Never used   Substance and Sexual Activity    Alcohol use: Yes     Comment: vodka on rocks 1 or 2 per day    Drug use: Never       Family History:     History reviewed. No pertinent family history. This is a 78 y.o. female who is pleasant, cooperative, alert and oriented x3, in no acute distress. Heart: Heart sounds are normal.  HR 70 regular rate and rhythm without murmur, gallop or rub. Lungs: Normal respiratory effort with equal expansion, good air exchange, unlabored and clear to auscultation without wheezes or rales bilaterally   Abdomen: soft, nontender, nondistended with bowel sounds active. Extremities: pedal pulses palpable with no pedal edema or calf tenderness bilaterally. Dorsiflexion and plantar flexion 5/5 bilaterally.        Labs:  Recent Labs     23  1118   HGB 13.6   HCT 43.4   WBC 7.3   .7*         K 3.9      CO2 26   BUN 20 CREATININE 0.88   GLUCOSE 86   INR 1.0   PROTIME 12.7   APTT 26.7       No results for input(s): COVID19 in the last 720 hours. EMANUEL Irizarry CNP  Electronically signed 2/3/2023 at 8:58 AM     Progress Notes  - documented in this encounter  Francis Guerrero MD - 01/09/2023 10:30 AM EST  Formatting of this note is different from the original.  Subjective   SUBJECTIVE:     Patient ID: Enriqueta Godwin is a 78 y.o. female who presents for a Medicare Annual Wellness exam.    HPI  Here for medicare physical and ER follow up. BP is ok. She was seen in the ER due to vomiting and urinary symptoms. She was treated for a uti. In her workup she had elevated troponin. She did not have chest pain but did have some neck pain. She saw cardiology. She had negative cardiac workup including a stress test. The stress test did show dense tissue in her breasts bilaterally, worse in the right. She had a mammogram that was negative. She denies any masses. Will get an u/s to make sure it is just fibroglandular tissue. She is going to be having surgery on her back. She is having a laminectomy by Dr Zaire Villatoro on 2/3. Having preop testing later this month. If that is ok then she will be cleared. Says she is feeling much better. She denies any LH, dizziness, chest pain or shortness of breath. She is due for a dexa. She has rheumatoid arthritis. That is stable. She sees rheumatology. She has depression. That is under control on her medications. No side effects.      The following portions of the patient's history were reviewed and updated as appropriate: allergies, current medications, past family history, past medical history, past social history, past surgical history and problem list.    AWV FLOWSHEET :     Lifestyle Assessment  Do you smoke or use smokeless tobacco?: No   If you smoke or use smokeless tobacco, are you ready to quit?: NA   Are you exposed to secondhand smoke?: No   On average, how many drinks of alcohol do you consume in a week?: (!) 6 - 9   Do you exercise for 30 or more minutes on average at least 3 days a week?: Sometimes   Do you have any tooth, denture, or oral problems?: No   Do you snore or has anyone told you that you snore?: No   Do you try to eat a balanced diet?: Yes   Do you experience leakage of urine, also known as urinary incontinence?: (!) Sometimes   Do you have difficulty performing any of these activities? (check all that apply): (!) Walking, Getting out of a chair   Do you have difficulty performing any of these activities? (check all that apply): ShoutOut, Driving     Fall Risk  Fall Risk Assessment Completed?: Yes    Have you fallen in the past year?: No       Are you worried about falling?: (!) Yes  Do you feel unsteady when standing or walking?: (!) Yes  Risk Stratification: Moderate Risk    Depression Screening  Little interest or pleasure in doing things: (!) Several days   Feeling down, depressed, or hopeless: (!) Several days  Trouble falling or staying asleep, or sleeping too much: (!) Several days  Feeling tired or having little energy: (!) Several days  Poor appetite or overeating: (!) Several days  Feeling bad about yourself - or that you are a failure or have let yourself or your family down: (!) Several days  Trouble concentrating on things, such as reading the newspaper or watching television: Not at all  Moving or speaking so slowly that other people could have noticed.  Or the opposite - being so fidgety or restless that you have been moving around a lot more than usual: Not at all  Thoughts that you would be better off dead, or of hurting yourself in some way: Not at all    Safety Assessment  Do you have throw rugs on the floor?: No  Do you feel safe at your home?: Yes  Do you feel unsteady when walking?: (!) Yes  Are you having difficulty with driving?: No  Do you have trouble seeing?: No  What assistive device do you use? (check all that apply): Rebecka Harada, Ophelia Whyte Assessment  Do you strain or struggle to hear/understand conversations?: No  Do you have trouble hearing the television or radio when others do not?: No  Does your family ever voice concerns about your hearing?: No  Do you wear hearing aid/s?: (!) Yes    Personal Health  During the past 4 weeks, how would you rate your overall health?: Good  Do you understand how to take all of your medications?: Yes  How confident are you that you can control and manage most of your health problems?: Very confident  In the past 12 months, how many times have you been hospitalized?: (!) 2 or more    End of Life Planning  Do you have a living will?: Yes  Do you have a durable power of ?: Yes    Cognitive Screening  Do you have trouble remembering or recalling facts or events?: (!) Yes  Do family members or caregivers report that you have difficulty remembering things?: No    Clock Drawing Test: Normal    REVIEW OF SYSTEMS:     Review of Systems   Constitutional: Negative. HENT: Negative. Eyes: Negative. Respiratory: Negative. Cardiovascular: Negative. Gastrointestinal: Negative. Endocrine: Negative. Genitourinary: Negative. Musculoskeletal: Negative. Skin: Negative. Allergic/Immunologic: Negative. Neurological: Negative. Hematological: Negative. Psychiatric/Behavioral: Negative. Objective   PHYSICAL EXAMINATION:     Vitals:   01/09/23 1051   BP: 128/86   BP Site: Left Arm   BP Postition: Sitting   BP CUFF SIZE: M (9-13 inches)   Pulse: 100   Resp: 16   SpO2: 97%   Weight: 66.2 kg (146 lb)   Height: 167.6 cm (5' 5.98\")       Physical Exam  Constitutional:   Appearance: She is well-developed. HENT:   Head: Normocephalic and atraumatic. Right Ear: External ear normal.   Left Ear: External ear normal.   Nose: Nose normal.   Eyes:   Conjunctiva/sclera: Conjunctivae normal.   Pupils: Pupils are equal, round, and reactive to light. Neck:   Thyroid: No thyromegaly.    Cardiovascular:   Rate and Rhythm: Normal rate and regular rhythm. Heart sounds: Normal heart sounds. No murmur heard. Pulmonary:   Effort: Pulmonary effort is normal.   Breath sounds: Normal breath sounds. Abdominal:   General: Bowel sounds are normal.   Palpations: Abdomen is soft. Tenderness: There is no abdominal tenderness. Musculoskeletal:   Cervical back: Normal range of motion and neck supple. Lymphadenopathy:   Cervical: No cervical adenopathy. Skin:  General: Skin is warm and dry. Neurological:   Mental Status: She is alert and oriented to person, place, and time. Cranial Nerves: No cranial nerve deficit. Assessment/Plan   ASSESSMENT/PLAN     Diagnoses and all orders for this visit:    Medicare annual wellness visit, subsequent    Mass of right breast, unspecified quadrant  - Ultrasound breast limited right; Future    Menopause  - Dexa scan central skeletal; Future    Rheumatoid arthritis with positive rheumatoid factor, involving unspecified site (CMS-HCC)    Current moderate episode of major depressive disorder without prior episode (CMS-[facility])    Other abnormal and inconclusive findings on diagnostic imaging of breast  - Ultrasound breast limited right; Future    Patient Instructions   Get labs. Get breast u/s. Get dexa. [id number] for surgery.        Electronically signed by Radha Padilla MD at 01/09/2023 4:40 PM EST

## 2023-02-03 NOTE — ANESTHESIA POSTPROCEDURE EVALUATION
Department of Anesthesiology  Postprocedure Note    Patient: Dalton Mayers  MRN: [id number]  YOB: 1940  Date of evaluation: 2/3/2023      Procedure Summary     Date: 02/03/23 Room / Location: [address] - INPATIENT    Anesthesia Start: 1031 Anesthesia Stop: 1250    Procedure: L 3-5 LUMBAR LAMINECTOMY   WITH REMOVAL OF VERTIFLEX L3-4 (Back) Diagnosis:       Spinal stenosis of lumbar region, unspecified whether neurogenic claudication present      (Spinal stenosis of lumbar region, unspecified whether neurogenic claudication present [M48.061])    Surgeons: Chino Alves MD Responsible Provider: Prema Hannon DO    Anesthesia Type: general ASA Status: 3          Anesthesia Type: No value filed.     Bill Phase I: Bill Score: 9    Bill Phase II:        Anesthesia Post Evaluation    Patient location during evaluation: PACU  Patient participation: complete - patient participated  Level of consciousness: awake and alert  Airway patency: patent  Nausea & Vomiting: no nausea and no vomiting  Complications: no  Cardiovascular status: hemodynamically stable  Respiratory status: acceptable  Hydration status: stable

## 2023-02-04 VITALS
DIASTOLIC BLOOD PRESSURE: 66 MMHG | RESPIRATION RATE: 18 BRPM | SYSTOLIC BLOOD PRESSURE: 112 MMHG | BODY MASS INDEX: 23.4 KG/M2 | OXYGEN SATURATION: 92 % | TEMPERATURE: 98.8 F | WEIGHT: 145 LBS | HEART RATE: 67 BPM

## 2023-02-04 LAB
ABSOLUTE EOS #: <0.03 K/UL (ref 0–0.44)
ABSOLUTE IMMATURE GRANULOCYTE: 0.05 K/UL (ref 0–0.3)
ABSOLUTE LYMPH #: 0.77 K/UL (ref 1.1–3.7)
ABSOLUTE MONO #: 0.93 K/UL (ref 0.1–1.2)
ANION GAP SERPL CALCULATED.3IONS-SCNC: 9 MMOL/L (ref 9–17)
BASOPHILS # BLD: 0 % (ref 0–2)
BASOPHILS ABSOLUTE: <0.03 K/UL (ref 0–0.2)
BUN SERPL-MCNC: 18 MG/DL (ref 8–23)
BUN/CREAT BLD: 17 (ref 9–20)
CALCIUM SERPL-MCNC: 8.8 MG/DL (ref 8.6–10.4)
CHLORIDE SERPL-SCNC: 108 MMOL/L (ref 98–107)
CO2 SERPL-SCNC: 26 MMOL/L (ref 20–31)
CREAT SERPL-MCNC: 1.05 MG/DL (ref 0.5–0.9)
EOSINOPHILS RELATIVE PERCENT: 0 % (ref 1–4)
FOLATE SERPL-MCNC: 19.8 NG/ML
GFR SERPL CREATININE-BSD FRML MDRD: 52 ML/MIN/1.73M2
GLUCOSE SERPL-MCNC: 117 MG/DL (ref 70–99)
HCT VFR BLD AUTO: 34.4 % (ref 36.3–47.1)
HGB BLD-MCNC: 10.9 G/DL (ref 11.9–15.1)
IMMATURE GRANULOCYTES: 0 %
LYMPHOCYTES # BLD: 6 % (ref 24–43)
MCH RBC QN AUTO: 34 PG (ref 25.2–33.5)
MCHC RBC AUTO-ENTMCNC: 31.7 G/DL (ref 28.4–34.8)
MCV RBC AUTO: 107.2 FL (ref 82.6–102.9)
MONOCYTES # BLD: 7 % (ref 3–12)
NRBC AUTOMATED: 0 PER 100 WBC
PDW BLD-RTO: 13.6 % (ref 11.8–14.4)
PLATELET # BLD AUTO: 166 K/UL (ref 138–453)
PMV BLD AUTO: 10.8 FL (ref 8.1–13.5)
POTASSIUM SERPL-SCNC: 4.2 MMOL/L (ref 3.7–5.3)
RBC # BLD: 3.21 M/UL (ref 3.95–5.11)
RBC # BLD: ABNORMAL 10*6/UL
SEG NEUTROPHILS: 87 % (ref 36–65)
SEGMENTED NEUTROPHILS ABSOLUTE COUNT: 10.8 K/UL (ref 1.5–8.1)
SODIUM SERPL-SCNC: 143 MMOL/L (ref 135–144)
VIT B12 SERPL-MCNC: 1051 PG/ML (ref 232–1245)
WBC # BLD AUTO: 12.6 K/UL (ref 3.5–11.3)

## 2023-02-04 PROCEDURE — 82746 ASSAY OF FOLIC ACID SERUM: CPT

## 2023-02-04 PROCEDURE — 36415 COLL VENOUS BLD VENIPUNCTURE: CPT

## 2023-02-04 PROCEDURE — 2580000003 HC RX 258: Performed by: ORTHOPAEDIC SURGERY

## 2023-02-04 PROCEDURE — 97530 THERAPEUTIC ACTIVITIES: CPT

## 2023-02-04 PROCEDURE — 6360000002 HC RX W HCPCS: Performed by: ORTHOPAEDIC SURGERY

## 2023-02-04 PROCEDURE — 85025 COMPLETE CBC W/AUTO DIFF WBC: CPT

## 2023-02-04 PROCEDURE — 97162 PT EVAL MOD COMPLEX 30 MIN: CPT

## 2023-02-04 PROCEDURE — 80048 BASIC METABOLIC PNL TOTAL CA: CPT

## 2023-02-04 PROCEDURE — 97110 THERAPEUTIC EXERCISES: CPT

## 2023-02-04 PROCEDURE — 6370000000 HC RX 637 (ALT 250 FOR IP): Performed by: ORTHOPAEDIC SURGERY

## 2023-02-04 PROCEDURE — 82607 VITAMIN B-12: CPT

## 2023-02-04 RX ORDER — HYDROCODONE BITARTRATE AND ACETAMINOPHEN 5; 325 MG/1; MG/1
1-2 TABLET ORAL
Qty: 60 TABLET | Refills: 0 | Status: SHIPPED | OUTPATIENT
Start: 2023-02-04 | End: 2023-02-11

## 2023-02-04 RX ORDER — PROMETHAZINE HYDROCHLORIDE 25 MG/ML
25 INJECTION, SOLUTION INTRAMUSCULAR; INTRAVENOUS EVERY 6 HOURS PRN
Status: DISCONTINUED | OUTPATIENT
Start: 2023-02-04 | End: 2023-02-04

## 2023-02-04 RX ORDER — PROCHLORPERAZINE EDISYLATE 5 MG/ML
10 INJECTION INTRAMUSCULAR; INTRAVENOUS EVERY 6 HOURS PRN
Status: DISCONTINUED | OUTPATIENT
Start: 2023-02-04 | End: 2023-02-04 | Stop reason: HOSPADM

## 2023-02-04 RX ORDER — METHOCARBAMOL 750 MG/1
750 TABLET, FILM COATED ORAL EVERY 8 HOURS PRN
Qty: 60 TABLET | Refills: 0 | Status: SHIPPED | OUTPATIENT
Start: 2023-02-04 | End: 2023-02-14

## 2023-02-04 RX ORDER — SENNA AND DOCUSATE SODIUM 50; 8.6 MG/1; MG/1
1 TABLET, FILM COATED ORAL 2 TIMES DAILY
Qty: 14 TABLET | Refills: 0 | Status: SHIPPED | OUTPATIENT
Start: 2023-02-04

## 2023-02-04 RX ADMIN — FLUTICASONE FUROATE AND VILANTEROL 1 PUFF: 100; 25 POWDER RESPIRATORY (INHALATION) at 09:34

## 2023-02-04 RX ADMIN — HYDROCODONE BITARTRATE AND ACETAMINOPHEN 1 TABLET: 5; 325 TABLET ORAL at 09:26

## 2023-02-04 RX ADMIN — METOPROLOL SUCCINATE 50 MG: 50 TABLET, EXTENDED RELEASE ORAL at 09:34

## 2023-02-04 RX ADMIN — Medication 2 SPRAY: at 09:32

## 2023-02-04 RX ADMIN — SERTRALINE HYDROCHLORIDE 100 MG: 100 TABLET, FILM COATED ORAL at 09:32

## 2023-02-04 RX ADMIN — DEXAMETHASONE SODIUM PHOSPHATE 6 MG: 10 INJECTION INTRAMUSCULAR; INTRAVENOUS at 11:23

## 2023-02-04 RX ADMIN — SODIUM CHLORIDE, PRESERVATIVE FREE 10 ML: 5 INJECTION INTRAVENOUS at 09:24

## 2023-02-04 RX ADMIN — SODIUM CHLORIDE, PRESERVATIVE FREE 10 ML: 5 INJECTION INTRAVENOUS at 11:25

## 2023-02-04 RX ADMIN — DEXAMETHASONE SODIUM PHOSPHATE 6 MG: 10 INJECTION INTRAMUSCULAR; INTRAVENOUS at 04:36

## 2023-02-04 RX ADMIN — HYDROXYCHLOROQUINE SULFATE 200 MG: 200 TABLET, FILM COATED ORAL at 09:33

## 2023-02-04 RX ADMIN — SODIUM CHLORIDE: 9 INJECTION, SOLUTION INTRAVENOUS at 04:37

## 2023-02-04 ASSESSMENT — PAIN - FUNCTIONAL ASSESSMENT: PAIN_FUNCTIONAL_ASSESSMENT: PREVENTS OR INTERFERES SOME ACTIVE ACTIVITIES AND ADLS

## 2023-02-04 ASSESSMENT — PAIN DESCRIPTION - LOCATION
LOCATION: BACK
LOCATION: BACK

## 2023-02-04 ASSESSMENT — PAIN DESCRIPTION - FREQUENCY
FREQUENCY: INTERMITTENT
FREQUENCY: INTERMITTENT

## 2023-02-04 ASSESSMENT — PAIN DESCRIPTION - PAIN TYPE
TYPE: SURGICAL PAIN
TYPE: SURGICAL PAIN

## 2023-02-04 ASSESSMENT — PAIN SCALES - GENERAL
PAINLEVEL_OUTOF10: 2
PAINLEVEL_OUTOF10: 3
PAINLEVEL_OUTOF10: 3

## 2023-02-04 ASSESSMENT — PAIN DESCRIPTION - DESCRIPTORS
DESCRIPTORS: DISCOMFORT
DESCRIPTORS: DISCOMFORT

## 2023-02-04 ASSESSMENT — PAIN DESCRIPTION - ONSET
ONSET: ON-GOING
ONSET: ON-GOING

## 2023-02-04 NOTE — PLAN OF CARE
Problem: Discharge Planning  Goal: Discharge to home or other facility with appropriate resources  Outcome: Progressing  Flowsheets (Taken 2/3/2023 1600)  Discharge to home or other facility with appropriate resources:   Identify barriers to discharge with patient and caregiver   Arrange for needed discharge resources and transportation as appropriate   Identify discharge learning needs (meds, wound care, etc)   Refer to discharge planning if patient needs post-hospital services based on physician order or complex needs related to functional status, cognitive ability or social support system     Problem: Pain  Goal: Verbalizes/displays adequate comfort level or baseline comfort level  Outcome: Progressing  Flowsheets (Taken 2/3/2023 1855)  Verbalizes/displays adequate comfort level or baseline comfort level:   Encourage patient to monitor pain and request assistance   Assess pain using appropriate pain scale   Administer analgesics based on type and severity of pain and evaluate response   Implement non-pharmacological measures as appropriate and evaluate response   Notify Licensed Independent Practitioner if interventions unsuccessful or patient reports new pain     Problem: Safety - Adult  Goal: Free from fall injury  Outcome: Progressing  Flowsheets (Taken 2/3/2023 1855)  Free From Fall Injury: Instruct family/caregiver on patient safety     Problem: ABCDS Injury Assessment  Goal: Absence of physical injury  Outcome: Progressing  Flowsheets (Taken 2/3/2023 1855)  Absence of Physical Injury: Implement safety measures based on patient assessment     Problem: Neurosensory - Adult  Goal: Achieves stable or improved neurological status  Outcome: Progressing  Flowsheets (Taken 2/3/2023 1855)  Achieves stable or improved neurological status:   Assess for and report changes in neurological status   Initiate measures to prevent increased intracranial pressure   Maintain blood pressure and fluid volume within ordered parameters to optimize cerebral perfusion and minimize risk of hemorrhage   Monitor temperature, glucose, and sodium.  Initiate appropriate interventions as ordered     Problem: Neurosensory - Adult  Goal: Achieves maximal functionality and self care  Outcome: Progressing  Flowsheets (Taken 2/3/2023 1855)  Achieves maximal functionality and self care:   Encourage and assist patient to increase activity and self care with guidance from physical therapy/occupational therapy   Monitor swallowing and airway patency with patient fatigue and changes in neurological status   Encourage visually impaired, hearing impaired and aphasic patients to use assistive/communication devices     Problem: Musculoskeletal - Adult  Goal: Return mobility to safest level of function  Outcome: Progressing  Flowsheets (Taken 2/3/2023 1855)  Return Mobility to Safest Level of Function:   Assess patient stability and activity tolerance for standing, transferring and ambulating with or without assistive devices   Assist with transfers and ambulation using safe patient handling equipment as needed   Obtain physical therapy/occupational therapy consults as needed   Instruct patient/family in ordered activity level   Ensure adequate protection for wounds/incisions during mobilization   Apply continuous passive motion per provider or physical therapy orders to increase flexion toward goal     Problem: Musculoskeletal - Adult  Goal: Return ADL status to a safe level of function  Outcome: Progressing  Flowsheets (Taken 2/3/2023 1855)  Return ADL Status to a Safe Level of Function:   Administer medication as ordered   Assess activities of daily living deficits and provide assistive devices as needed   Obtain physical therapy/occupational therapy consults as needed   Assist and instruct patient to increase activity and self care as tolerated     Problem: Genitourinary - Adult  Goal: Absence of urinary retention  Outcome: Progressing  Flowsheets (Taken 2/3/2023 1855)  Absence of urinary retention:   Assess patients ability to void and empty bladder   Monitor intake/output and perform bladder scan as needed     Problem: Metabolic/Fluid and Electrolytes - Adult  Goal: Electrolytes maintained within normal limits  Outcome: Progressing  Flowsheets (Taken 2/3/2023 1855)  Electrolytes maintained within normal limits:   Monitor labs and assess patient for signs and symptoms of electrolyte imbalances   Administer electrolyte replacement as ordered   Monitor response to electrolyte replacements, including repeat lab results as appropriate

## 2023-02-04 NOTE — PROGRESS NOTES
[facility]  Attending Progress Note  2/4/2023  10:42 AM     Sumi Monge    1940   [id number]      SUBJECTIVE:  POD 1. Pain controlled. Denies LE numbness or tingling. Worked with PT, walking well. Voiding. No other complaints. OBJECTIVE      Physical      VITALS:  /62   Pulse 59   Temp 98.1 °F (36.7 °C) (Oral)   Resp 18   Wt 145 lb (65.8 kg)   SpO2 95%   BMI 23.40 kg/m²   Sitting up in chair  Dressing C/D/I    NEUROLOGIC: Alert and Oriented x 3. Strength 5/5 HF, 5/5 Q, 5/5 TA, 5/5 EHL, 5/5 GS. Calves supple, nontender  Sensation intact.      Data  CBC:   Lab Results   Component Value Date/Time    WBC 12.6 02/04/2023 06:10 AM    RBC 3.21 02/04/2023 06:10 AM    HGB 10.9 02/04/2023 06:10 AM    HCT 34.4 02/04/2023 06:10 AM    .2 02/04/2023 06:10 AM    MCH 34.0 02/04/2023 06:10 AM    MCHC 31.7 02/04/2023 06:10 AM    RDW 13.6 02/04/2023 06:10 AM     02/04/2023 06:10 AM    MPV 10.8 02/04/2023 06:10 AM     CBC with Differential:    Lab Results   Component Value Date/Time    WBC 12.6 02/04/2023 06:10 AM    RBC 3.21 02/04/2023 06:10 AM    HGB 10.9 02/04/2023 06:10 AM    HCT 34.4 02/04/2023 06:10 AM     02/04/2023 06:10 AM    .2 02/04/2023 06:10 AM    MCH 34.0 02/04/2023 06:10 AM    MCHC 31.7 02/04/2023 06:10 AM    RDW 13.6 02/04/2023 06:10 AM    LYMPHOPCT 6 02/04/2023 06:10 AM    MONOPCT 7 02/04/2023 06:10 AM    BASOPCT 0 02/04/2023 06:10 AM    MONOSABS 0.93 02/04/2023 06:10 AM    LYMPHSABS 0.77 02/04/2023 06:10 AM    EOSABS <0.03 02/04/2023 06:10 AM    BASOSABS <0.03 02/04/2023 06:10 AM     CMP:    Lab Results   Component Value Date/Time     02/04/2023 06:10 AM    K 4.2 02/04/2023 06:10 AM     02/04/2023 06:10 AM    CO2 26 02/04/2023 06:10 AM    BUN 18 02/04/2023 06:10 AM    CREATININE 1.05 02/04/2023 06:10 AM    LABGLOM 52 02/04/2023 06:10 AM    GLUCOSE 117 02/04/2023 06:10 AM    CALCIUM 8.8 02/04/2023 06:10 AM           Current Inpatient Medications    Current Facility-Administered Medications: prochlorperazine (COMPAZINE) injection 10 mg, 10 mg, IntraMUSCular, Q6H PRN  albuterol sulfate HFA (PROVENTIL;VENTOLIN;PROAIR) 108 (90 Base) MCG/ACT inhaler 2 puff, 2 puff, Inhalation, Q4H PRN  atorvastatin (LIPITOR) tablet 10 mg (Patient Supplied), 10 mg, Oral, Daily  fluticasone (FLONASE) 50 MCG/ACT nasal spray 2 spray (Patient Supplied), 2 spray, Nasal, Daily  Fluticasone Furoate-Vilanterol 100-25 MCG/ACT AEPB 1 puff (Patient Supplied), 1 puff, Inhalation, Daily  hydroxychloroquine (PLAQUENIL) tablet 200 mg (Patient Supplied), 200 mg, Oral, Daily  montelukast (SINGULAIR) tablet 10 mg (Patient Supplied), 10 mg, Oral, Nightly  sertraline (ZOLOFT) tablet 100 mg (Patient Supplied), 100 mg, Oral, Daily  0.9 % sodium chloride infusion, , IntraVENous, Continuous  sodium chloride flush 0.9 % injection 5-40 mL, 5-40 mL, IntraVENous, 2 times per day  sodium chloride flush 0.9 % injection 5-40 mL, 5-40 mL, IntraVENous, PRN  0.9 % sodium chloride infusion, , IntraVENous, PRN  morphine (PF) injection 2 mg, 2 mg, IntraVENous, Q2H PRN **OR** morphine sulfate (PF) injection 4 mg, 4 mg, IntraVENous, Q2H PRN  hydrOXYzine HCl (ATARAX) tablet 10 mg, 10 mg, Oral, Q8H PRN  polyethylene glycol (GLYCOLAX) packet 17 g, 17 g, Oral, Daily  sennosides-docusate sodium (SENOKOT-S) 8.6-50 MG tablet 1 tablet, 1 tablet, Oral, BID  methocarbamol (ROBAXIN) tablet 750 mg, 750 mg, Oral, Q8H PRN  HYDROcodone-acetaminophen (NORCO) 5-325 MG per tablet 1 tablet, 1 tablet, Oral, Q4H PRN  HYDROcodone-acetaminophen (NORCO) 5-325 MG per tablet 2 tablet, 2 tablet, Oral, Q4H PRN  dexamethasone (PF) (DECADRON) injection 6 mg, 6 mg, IntraVENous, Q8H  metoprolol succinate (TOPROL XL) extended release tablet 50 mg (Patient Supplied), 50 mg, Oral, Daily    ASSESSMENT AND PLAN    78 y.o. female status post L3-5 laminectomy, removal of vertiflex post op day #  1    1. PT- WBAT  2. Pain control  3. EPC  4.  D/C planning - home when ok with medicine  5.  [facility]  Spine Surgeon  [phone]

## 2023-02-04 NOTE — PROGRESS NOTES
Physical Therapy  Facility/Department: [facility] MED SURG  Physical Therapy Initial Assessment    Name: Niru Soria  : 1940  MRN: [id number]  Date of Service: 2023    Discharge Recommendations:  Patient would benefit from continued therapy after discharge      Patient Diagnosis(es): There were no encounter diagnoses. Past Medical History:  has a past medical history of Arthritis, Cancer ([facility] [address] Chronic fatigue, CKD (chronic kidney disease), COPD (chronic obstructive pulmonary disease) ([facility] [address] DDD (degenerative disc disease), cervical, Fibromyalgia, Hyperlipidemia, Hypertension, Hypothyroid, Insomnia, Memory loss, Prolonged emergence from general anesthesia, Rheumatoid arthritis ([facility] [address] and Spondylolisthesis. Past Surgical History:  has a past surgical history that includes Knee Arthroplasty (Bilateral); Cholecystectomy; Tonsillectomy; eye surgery; Revision Colostomy; and colostomy. Assessment   Body Structures, Functions, Activity Limitations Requiring Skilled Therapeutic Intervention: Decreased functional mobility   Assessment: Pt presents post op day one w/ good stabitliy and tolerance to being vertical. Pt motivated and has supportive family. Pt understands education on how to maximize healing and prevent infection. Would expect a safe d/c home.   Specific Instructions for Next Treatment: gait /stairs  Therapy Prognosis: Good  Decision Making: Medium Complexity  Clinical Presentation: evolving  Activity Tolerance  Activity Tolerance: Patient tolerated evaluation without incident     Plan   Physcial Therapy Plan  General Plan: 1 time a day 7 days a week  Specific Instructions for Next Treatment: gait Zohra Ann  Current Treatment Recommendations: Functional mobility training, Stair training, Gait training, Home exercise program, Positioning  Safety Devices  Type of Devices: Nurse notified, Call light within reach, Left in chair     Restrictions  Restrictions/Precautions  Restrictions/Precautions: Up as Tolerated  Required Braces or Orthoses?: No     Subjective   General  Chart Reviewed: Yes  Patient assessed for rehabilitation services?: Yes  Response To Previous Treatment: Not applicable  Family / Caregiver Present: Yes  Follows Commands: Within Functional Limits  Subjective  Subjective: Pt didn't sleep well; pain controlled this am prior to walking; post walking and in chair 4/10.          Social/Functional History  Social/Functional History  Lives With: Significant other  Home Layout: Two level, Able to Live on Main level with bedroom/bathroom  Home Access: Stairs to enter with rails  Entrance Stairs - Number of Steps: 3  Bathroom Shower/Tub: Walk-in shower  Receives Help From: Family  ADL Assistance: Independent  Homemaking Assistance: Independent  Homemaking Responsibilities: No (pt declined over last 3 months due back pain)  Ambulation Assistance: Independent  Transfer Assistance: Independent  Vision/Hearing  Vision  Vision: Impaired  Vision Exceptions: Wears glasses for reading  Hearing  Hearing: Exceptions to Go800  Hearing Exceptions: Bilateral hearing aid    Cognition         Objective   Heart Rate: 59  Heart Rate Source: Monitor  BP: 139/62  BP Location: Right upper arm  Patient Position: Sitting  MAP (Calculated): 88  Resp: 17  SpO2: 95 %  O2 Device: None (Room air)     Observation/Palpation  Observation: movement slow;        AROM RLE (degrees)  RLE AROM: WFL  AROM LLE (degrees)  LLE AROM : WFL  AROM RUE (degrees)  RUE AROM : WFL  AROM LUE (degrees)  LUE AROM : WFL  Strength RLE  Comment: weight bearing strength; functionally 3+/5  Strength LLE  Comment: weight bearing strength; functionally 3+/5           Bed mobility  Rolling to Left: Modified independent  Rolling to Right: Modified independent  Supine to Sit: Modified independent  Sit to Supine: Modified independent  Transfers  Sit to Stand: Stand by assistance  Stand to Sit: Stand by assistance  Bed to Chair: Stand by assistance  Ambulation  Surface: Level tile  Device: Rolling Walker  Assistance: Contact guard assistance  Quality of Gait: gait w/ apprehension but good weight bearing control. Gait Deviations: Slow Flor; Increased MARK  Distance: 200 ft x 1     Balance  Posture: Good  Sitting - Static: Good  Sitting - Dynamic: Good  Standing - Static: Good  Standing - Dynamic: Good;- (w/rwalker)     HEP - walking program, circulation ex and deep breathing - spirometer    AM-PAC Score 18/24     Goals  Short Term Goals  Time Frame for Short Term Goals: 12  Short Term Goal 1: Pt independent w/ amb 300 ft x 1  Short Term Goal 2: Pt able to ascend / descend 3 steps independently  Patient Goals   Patient Goals : pt goal is to get home asap       Education  Patient Education  Education Given To: Patient; Family  Education Provided: Role of Therapy;Plan of Care;Transfer Training  Education Method: Demonstration;Verbal  Education Outcome: Verbalized understanding;Demonstrated understanding      Therapy Time   Individual Concurrent Group Co-treatment   Time In [id number]         Time Out 0751         Minutes 38           Total patient care time 49 minutes.  Treatment time 26      JESSICA ELENA, PT

## 2023-02-04 NOTE — PROGRESS NOTES
[facility]  Office: [address], DO, Stuart lAvarado, DO, Stephen Romero, DO, Ralph Rondon Blood, DO, Teresa Lincoln MD, Lin Smallwood MD, Mohamud Woods MD, Lydia Almanza MD,  Trista Gimenez MD, Ottoniel Quiñonez MD, Pedro Martinez, DO, Juana Morales MD,  Stefan Fox MD, Elton Dewitt MD, Sabrina Willis, DO, Néstor Sy MD, Chandler Lombard, MD, Ernie Garcia, DO, Jeane Kaufman MD, Guerline Potter MD, Rica Dubon MD, Skinny Silva MD, Vikki Schwarz, DO, Alisno Mitchell MD, Montrell Pires MD, Desiree Quinn, CNP,  Tony Gore, CNP, Ramya Khan, CNP, Gwendolyn Gardner, CNP,  Garry Davis, DNP, Irving Cole, CNP, Kilo Gracia, CNP, Inocencio Conley, CNP, Jose F Back, CNP, Jamie Rome, CNP, Ash Francis PA-C, Spencer Potts, CNS, Liana Xiao, CNP, Marina Haas [facility] / HISTORY AND PHYSICAL EXAMINATION            Date:   2/3/2023  Patient name:  Lloyd Quispe  Date of admission:  2/3/2023  8:07 AM  MRN:   [id number]  Account:  [de-identified]  YOB: 1940  PCP:    Akash Esposito DO  Room:   2003/2003-02  Code Status:    Full Code    Physician Requesting Consult: Bishop Herrera MD    Reason for Consult: Medical management of chronic medical conditions    Chief Complaint:     Back pain    History Obtained From:     patient    History of Present Illness:     Patient reports to the hospital for scheduled back surgery. We are consulted for management of chronic medical conditions. Patient reports that she sees her primary care provider on a regular basis and reports that her chronic medical conditions have been under control. Patient has a known history of hypertension with hyperlipidemia for which she is properly treated.   Patient also reports that she has COPD and has been managed by her primary care provider with a maintenance inhaler as well as rescue inhaler and she reports no complications with this. Patient is hemodynamically stable. Internal medicine recommends continuing home medication regiment. If patient is stable and working with therapy tomorrow internal medicine has no objection to discharge. Past Medical History:     Past Medical History:   Diagnosis Date    Arthritis     Cancer ([facility] [address]     skin removed    Chronic fatigue     CKD (chronic kidney disease)     COPD (chronic obstructive pulmonary disease) (HCC)     DDD (degenerative disc disease), cervical     Fibromyalgia     Hyperlipidemia     Hypertension     Hypothyroid     no longer on medication    Insomnia     Memory loss     Prolonged emergence from general anesthesia     Rheumatoid arthritis ([facility] [address]     Spondylolisthesis     lumbar        Past Surgical History:     Past Surgical History:   Procedure Laterality Date    CHOLECYSTECTOMY      COLOSTOMY      EYE SURGERY      cataracts    KNEE ARTHROPLASTY Bilateral     [place]    REVISION COLOSTOMY      TONSILLECTOMY          Medications Prior to Admission:     Prior to Admission medications    Medication Sig Start Date End Date Taking?  Authorizing Provider   metoprolol succinate (TOPROL XL) 50 MG extended release tablet Take 50 mg by mouth at bedtime   Yes Historical Provider, MD   acetaminophen (TYLENOL) 500 MG tablet Take 500 mg by mouth every 6 hours as needed  Patient not taking: Reported on 2/3/2023    Historical Provider, MD   albuterol sulfate HFA (PROVENTIL;VENTOLIN;PROAIR) 108 (90 Base) MCG/ACT inhaler Inhale 2 puffs into the lungs every 4 hours as needed    Historical Provider, MD   aspirin 81 MG EC tablet Take 81 mg by mouth daily 1/4/23   Historical Provider, MD   atorvastatin (LIPITOR) 10 MG tablet Take 10 mg by mouth daily 1/4/23   Historical Provider, MD   Fluticasone Furoate-Vilanterol 100-25 MCG/ACT AEPB Inhale 1 puff into the lungs daily 6/3/20   Historical Provider, MD   fluticasone (FLONASE) 50 MCG/ACT nasal spray 2 sprays by Nasal route daily 11/7/22   Historical Provider, MD   folic acid (FOLVITE) 1 MG tablet Take 1 mg by mouth daily    Historical Provider, MD   hydroxychloroquine (PLAQUENIL) 200 MG tablet Take 200 mg by mouth daily 1/27/20   Historical Provider, MD   methotrexate (RHEUMATREX) 2.5 MG chemo tablet Take 2.5 mg by mouth once a week 9/20/22   Historical Provider, MD   montelukast (SINGULAIR) 10 MG tablet Take 10 mg by mouth nightly 5/19/21   Historical Provider, MD   sertraline (ZOLOFT) 100 MG tablet Take 100 mg by mouth daily 5/19/20   Historical Provider, MD        Allergies:     Ciprofloxacin, Levofloxacin, Lisinopril, Sulfamethoxazole-trimethoprim, and Cefazolin    Social History:     Tobacco:    reports that she quit smoking about 38 years ago. Her smoking use included cigarettes. She has never used smokeless tobacco.  Alcohol:      reports current alcohol use. Drug Use:  reports no history of drug use. Family History:     Family History   Problem Relation Age of Onset    Heart Disease Father        Review of Systems:     Positive and Negative as described in HPI. CONSTITUTIONAL:  negative for fevers, chills, sweats, fatigue, weight loss  HEENT:  negative for vision, hearing changes, runny nose, throat pain  RESPIRATORY:  negative for shortness of breath, cough, congestion, wheezing. CARDIOVASCULAR:  negative for chest pain, palpitations.   GASTROINTESTINAL:  negative for nausea, vomiting, diarrhea, constipation, change in bowel habits, abdominal pain   GENITOURINARY:  negative for difficulty of urination, burning with urination, frequency   INTEGUMENT:  negative for rash, skin lesions, easy bruising   HEMATOLOGIC/LYMPHATIC:  negative for swelling/edema   ALLERGIC/IMMUNOLOGIC:  negative for urticaria , itching  ENDOCRINE:  negative increase in drinking, increase in urination, hot or cold intolerance  MUSCULOSKELETAL:  negative joint pains, muscle aches, swelling of joints  NEUROLOGICAL:  negative for headaches, dizziness, lightheadedness, numbness, pain, tingling extremities  BEHAVIOR/PSYCH:  negative for depression, anxiety    Physical Exam:     BP (!) 102/54   Pulse 72   Temp 98.1 °F (36.7 °C)   Resp 16   Wt 145 lb (65.8 kg)   SpO2 92%   BMI 23.40 kg/m²   Temp (24hrs), Av.6 °F (36.4 °C), Min:97 °F (36.1 °C), Max:98.1 °F (36.7 °C)    No results for input(s): POCGLU in the last 72 hours. Intake/Output Summary (Last 24 hours) at 2/3/2023 1946  Last data filed at 2/3/2023 1857  Gross per 24 hour   Intake 1899.9 ml   Output --   Net 1899.9 ml       General Appearance:  alert, well appearing, and in no acute distress  Mental status: oriented to person, place, and time with normal affect  Head:  normocephalic, atraumatic. Eye: no icterus, redness, pupils equal and reactive, extraocular eye movements intact, conjunctiva clear  Ear: normal external ear, no discharge, hearing intact  Nose:  no drainage noted  Mouth: mucous membranes moist  Neck: supple, no carotid bruits, thyroid not palpable  Lungs: Bilateral equal air entry, clear to ausculation, no wheezing, rales or rhonchi, normal effort  Cardiovascular: normal rate, regular rhythm, no murmur, gallop, rub. Abdomen: Soft, nontender, nondistended, normal bowel sounds, no hepatomegaly or splenomegaly  Neurologic: There are no new focal motor or sensory deficits, normal muscle tone and bulk, no abnormal sensation, normal speech, cranial nerves II through XII grossly intact  Skin: No gross lesions, rashes, bruising or bleeding on exposed skin area  Extremities:  peripheral pulses palpable, no pedal edema or calf pain with palpation  Psych: normal affect     Investigations:      Laboratory Testing:  No results found for this or any previous visit (from the past 24 hour(s)). Imaging/Diagonstics:  No results found.     Assessment :      Hospital Problems             Last Modified POA    * (Principal) Lumbar stenosis with neurogenic claudication (Chronic) 2/3/2023 Yes    Retained orthopedic hardware (Chronic) 2/3/2023 Yes    CKD (chronic kidney disease) 2/3/2023 Yes    COPD (chronic obstructive pulmonary disease) ([address] 2/3/2023 Yes    Hyperlipidemia 2/3/2023 Yes    Hypertension 2/3/2023 Yes       Plan:     Lumbar stenosis, status postoperative intervention  Postoperative care per primary  PT/OT  Fall precaution  COPD  As needed bronchodilators  Continue fluticasone maintenance inhaler  CKD  Avoid nephrotoxic's as able  Check renal function  Hypertension with hyperlipidemia  Hemodynamically stable, continue home medication regiment    Consultations:   IP CONSULT TO HOSPITALIST      EMANUEL Chaudhary NP  2/3/2023  7:46 PM    Copy sent to Dr. Huber Valdez DO

## 2023-02-04 NOTE — PLAN OF CARE
Problem: Discharge Planning  Goal: Discharge to home or other facility with appropriate resources  2/4/2023 0409 by Sofi Sethi RN  Outcome: Progressing  2/3/2023 2005 by Estefania Mancia RN  Outcome: Progressing  Flowsheets (Taken 2/3/2023 1600)  Discharge to home or other facility with appropriate resources:   Identify barriers to discharge with patient and caregiver   Arrange for needed discharge resources and transportation as appropriate   Identify discharge learning needs (meds, wound care, etc)   Refer to discharge planning if patient needs post-hospital services based on physician order or complex needs related to functional status, cognitive ability or social support system     Problem: Pain  Goal: Verbalizes/displays adequate comfort level or baseline comfort level  2/4/2023 0409 by Sofi Sethi RN  Outcome: Progressing  Flowsheets (Taken 2/3/2023 1855 by Estefania Mancia RN)  Verbalizes/displays adequate comfort level or baseline comfort level:   Encourage patient to monitor pain and request assistance   Assess pain using appropriate pain scale   Administer analgesics based on type and severity of pain and evaluate response   Implement non-pharmacological measures as appropriate and evaluate response   Notify Licensed Independent Practitioner if interventions unsuccessful or patient reports new pain  2/3/2023 2005 by Estefania Mancia RN  Outcome: Progressing  Flowsheets (Taken 2/3/2023 1855)  Verbalizes/displays adequate comfort level or baseline comfort level:   Encourage patient to monitor pain and request assistance   Assess pain using appropriate pain scale   Administer analgesics based on type and severity of pain and evaluate response   Implement non-pharmacological measures as appropriate and evaluate response   Notify Licensed Independent Practitioner if interventions unsuccessful or patient reports new pain     Problem: Safety - Adult  Goal: Free from fall injury  2/4/2023 0409 by Natalie Hernandez Alexis Page RN  Outcome: Progressing  Flowsheets (Taken 2/4/2023 0409)  Free From Fall Injury: Instruct family/caregiver on patient safety  2/3/2023 2005 by Osmel Dennis RN  Outcome: Progressing  Flowsheets (Taken 2/3/2023 1855)  Free From Fall Injury: Instruct family/caregiver on patient safety     Problem: ABCDS Injury Assessment  Goal: Absence of physical injury  2/4/2023 0409 by Amita Taylor RN  Outcome: Progressing  Flowsheets (Taken 2/4/2023 0409)  Absence of Physical Injury: Implement safety measures based on patient assessment  2/3/2023 2005 by Osmel Dennis RN  Outcome: Progressing  Flowsheets (Taken 2/3/2023 1855)  Absence of Physical Injury: Implement safety measures based on patient assessment     Problem: Neurosensory - Adult  Goal: Achieves stable or improved neurological status  2/4/2023 0409 by Amita Taylor RN  Flowsheets (Taken 2/4/2023 8609)  Achieves stable or improved neurological status:   Assess for and report changes in neurological status   Monitor temperature, glucose, and sodium. Initiate appropriate interventions as ordered  2/3/2023 2005 by Osmel Dennis RN  Outcome: Progressing  Flowsheets (Taken 2/3/2023 1855)  Achieves stable or improved neurological status:   Assess for and report changes in neurological status   Initiate measures to prevent increased intracranial pressure   Maintain blood pressure and fluid volume within ordered parameters to optimize cerebral perfusion and minimize risk of hemorrhage   Monitor temperature, glucose, and sodium.  Initiate appropriate interventions as ordered  Goal: Achieves maximal functionality and self care  2/4/2023 0409 by Amita Taylor RN  Outcome: Progressing  Flowsheets (Taken 2/4/2023 0409)  Achieves maximal functionality and self care: Encourage and assist patient to increase activity and self care with guidance from physical therapy/occupational therapy  2/3/2023 2005 by Osmel Dennis RN  Outcome: Progressing  Flowsheets (Taken 2/3/2023 1855)  Achieves maximal functionality and self care:   Encourage and assist patient to increase activity and self care with guidance from physical therapy/occupational therapy   Monitor swallowing and airway patency with patient fatigue and changes in neurological status   Encourage visually impaired, hearing impaired and aphasic patients to use assistive/communication devices     Problem: Musculoskeletal - Adult  Goal: Return mobility to safest level of function  2/4/2023 0409 by Power Silva RN  Outcome: Progressing  2/3/2023 2005 by Raj Leon RN  Outcome: Progressing  Flowsheets (Taken 2/3/2023 1855)  Return Mobility to Safest Level of Function:   Assess patient stability and activity tolerance for standing, transferring and ambulating with or without assistive devices   Assist with transfers and ambulation using safe patient handling equipment as needed   Obtain physical therapy/occupational therapy consults as needed   Instruct patient/family in ordered activity level   Ensure adequate protection for wounds/incisions during mobilization   Apply continuous passive motion per provider or physical therapy orders to increase flexion toward goal  Goal: Return ADL status to a safe level of function  2/4/2023 0409 by Power Silva RN  Outcome: Progressing  2/3/2023 2005 by Raj Leon RN  Outcome: Progressing  Flowsheets (Taken 2/3/2023 1855)  Return ADL Status to a Safe Level of Function:   Administer medication as ordered   Assess activities of daily living deficits and provide assistive devices as needed   Obtain physical therapy/occupational therapy consults as needed   Assist and instruct patient to increase activity and self care as tolerated     Problem: Genitourinary - Adult  Goal: Absence of urinary retention  2/4/2023 0409 by Power Silva RN  Outcome: Progressing  2/3/2023 2005 by Raj Leon RN  Outcome: Progressing  Flowsheets (Taken 2/3/2023 1855)  Absence of urinary retention:   Assess patients ability to void and empty bladder   Monitor intake/output and perform bladder scan as needed     Problem: Metabolic/Fluid and Electrolytes - Adult  Goal: Electrolytes maintained within normal limits  2/4/2023 0409 by Margarita Rai RN  Outcome: Progressing  2/3/2023 2005 by Naldo Barboza RN  Outcome: Progressing  Flowsheets (Taken 2/3/2023 0625)  Electrolytes maintained within normal limits:   Monitor labs and assess patient for signs and symptoms of electrolyte imbalances   Administer electrolyte replacement as ordered   Monitor response to electrolyte replacements, including repeat lab results as appropriate

## 2023-02-04 NOTE — PLAN OF CARE
Problem: Discharge Planning  Goal: Discharge to home or other facility with appropriate resources  2/4/2023 1140 by Bebe Keller RN  Outcome: Progressing  2/4/2023 0409 by Maria Guadalupe Lee RN  Outcome: Progressing     Problem: Pain  Goal: Verbalizes/displays adequate comfort level or baseline comfort level  2/4/2023 1140 by Bebe Keller RN  Outcome: Progressing  2/4/2023 0409 by Maria Guadalupe Lee RN  Outcome: Progressing  Flowsheets (Taken 2/3/2023 1855 by Yolis Tavarez RN)  Verbalizes/displays adequate comfort level or baseline comfort level:   Encourage patient to monitor pain and request assistance   Assess pain using appropriate pain scale   Administer analgesics based on type and severity of pain and evaluate response   Implement non-pharmacological measures as appropriate and evaluate response   Notify Licensed Independent Practitioner if interventions unsuccessful or patient reports new pain     Problem: Safety - Adult  Goal: Free from fall injury  2/4/2023 1140 by Bebe Keller RN  Outcome: Progressing  2/4/2023 0409 by Maria Guadalupe Lee RN  Outcome: Progressing  Flowsheets (Taken 2/4/2023 0409)  Free From Fall Injury: Instruct family/caregiver on patient safety     Problem: ABCDS Injury Assessment  Goal: Absence of physical injury  2/4/2023 1140 by Bebe Keller RN  Outcome: Progressing  2/4/2023 0409 by Maria Guadalupe Lee RN  Outcome: Progressing  Flowsheets (Taken 2/4/2023 0409)  Absence of Physical Injury: Implement safety measures based on patient assessment     Problem: Neurosensory - Adult  Goal: Achieves stable or improved neurological status  2/4/2023 1140 by Bebe Keller RN  Outcome: Progressing  2/4/2023 0409 by Maria Guadalupe Lee RN  Flowsheets (Taken 2/4/2023 8173)  Achieves stable or improved neurological status:   Assess for and report changes in neurological status   Monitor temperature, glucose, and sodium.  Initiate appropriate interventions as ordered  Goal: Achieves maximal functionality and self care  2/4/2023 1140 by Cedric Mcqueen RN  Outcome: Progressing  2/4/2023 0409 by Carrie Connolly RN  Outcome: Progressing  Flowsheets (Taken 2/4/2023 0409)  Achieves maximal functionality and self care: Encourage and assist patient to increase activity and self care with guidance from physical therapy/occupational therapy     Problem: Musculoskeletal - Adult  Goal: Return mobility to safest level of function  2/4/2023 1140 by Cedric Mcqueen RN  Outcome: Progressing  2/4/2023 0409 by Carrei Connolly RN  Outcome: Progressing  Goal: Return ADL status to a safe level of function  2/4/2023 1140 by Cedric Mcqueen RN  Outcome: Progressing  2/4/2023 0409 by Carrie Connolly RN  Outcome: Progressing     Problem: Genitourinary - Adult  Goal: Absence of urinary retention  2/4/2023 1140 by Cedric Mcqueen RN  Outcome: Progressing  2/4/2023 0409 by Carrie Connolly RN  Outcome: Progressing     Problem: Metabolic/Fluid and Electrolytes - Adult  Goal: Electrolytes maintained within normal limits  2/4/2023 1140 by Cedric Mcqueen RN  Outcome: Progressing  2/4/2023 0409 by Carrie Connolly RN  Outcome: Progressing

## 2023-02-04 NOTE — DISCHARGE SUMMARY
Physician Discharge Summary     Patient ID:  Sherrill W Black  [id number]  82 y.o.  1940    Admit date: 2/3/2023    Discharge date and time: 2/4/23    Admitting Physician: Earnest Fernandes MD     Discharge Physician: Earnest Fernandes MD    Admission Diagnoses: Spinal stenosis of lumbar region, unspecified whether neurogenic claudication present [M48.061]  Lumbar stenosis with neurogenic claudication [M48.062]    Discharge Diagnoses: Spinal stenosis of lumbar region, unspecified whether neurogenic claudication present [M48.061]  Lumbar stenosis with neurogenic claudication [M48.062]  S/p L3-5 laminectomy, removal of vertiflex    Hospital Course: Patient admitted for S/p L3-5 laminectomy, removal of vertiflex. Postop course uneventful. IM consulted for medical management. Work with PT. D/c home on POD 1.    Consults:  IM, PT      Disposition: home    Patient Instructions:   Meds: see list  Activity: WBAT  Diet: regular  Wound Care: dry dressing daily x 1wk    Follow-up 2 weeks  Signed:  TWAN JIMÉNEZ PA-C  2/4/2023  10:51 AM

## 2023-02-07 NOTE — OP NOTE
Operative Note      Patient: Mackenzie Padilla  YOB: 1940  MRN: [id number]    Date of Procedure: 2/3/2023    SURGEON:  Carlos Bradford MD.     ANESTHESIA:  General endotracheal anesthesia. PREOPERATIVE DIAGNOSIS:  1. L3-5 lumbar spinal stenosis. 2.  Retained hardware L3-4     POSTOPERATIVE DIAGNOSIS: 1. L3-5 lumbar spinal stenosis. 2. Retained hardware L3-4     PROCEDURE:  1. L3-4 laminectomy with partial medial facetectomies and      bilateral foraminotomies  2. L4-5 laminectomy with partial medial facetectomies and      bilateral foraminotomies  3. L3-4 deep spinal hardware removal  4. Intraoperative use of C-arm fluoroscopy. ESTIMATED BLOOD LOSS:  150 mL. FLUIDS:  Per anesthesia record. COMPLICATIONS:  None. INDICATIONS:  This is a pleasant 77-year-old female with a  history of severe bilateral leg pain with walking. He had MRI performed, which had severe stenosis and   degeneration at L3-4 and L4-5 consistent with her symptoms. Due to the fact that he had failed all conservative management,  it was discussed with him the option of performing a laminectomy  and decompression at those levels with discectomy. Risks were discussed  including bleeding, infection, injury to nerves, vessels,  anesthetic risk, the need for possible further future surgery, as  well as the possibility for continued pain, continued symptoms,  and possible extension of fusion. He did understand all these  risks and wished to proceed. Informed consent was obtained. DESCRIPTION OF PROCEDURE:  The patient was taken to the operating  room, kept supine on her bed. She was intubated, placed under  general anesthesia by the anesthesiologist.  She was given  preoperative antibiotic prophylaxis. He was then placed prone on  the Jd table. All bony prominences were padded.   Eyes were  kept free of any pressure, brachial and elbows were kept free. The back was then prepped and draped in a sterile fashion. A  spinal needle was used to localize the L4-5 level and C-arm was  brought in and this area was marked. The area was then  infiltrated with 0.5% Marcaine with epinephrine. Approximately,  a 4 to 5 cm incision extending down to the subcutaneous tissues. Once this was done and fully exposed from L3 to L5, [place] was   used to further thin out the lamina and spinous processes   were removed at L4, partial L3 and the superior aspect of L5. The L3-4 vertiflex hardware was found and removed with a rongeur. The  laminectomy was then completed. Partial medial facetectomies  were then performed at L4-5 and L3-4 levels and bilateral  foraminotomies as well. Once the decompression was complete,  [place] [facility] was used to verify that the nerve roots were completely  freed from L3-5, which they were. The wound was then irrigated  with sterile saline and bacitracin. Retractor was removed and closure was then performed  with 0 Vicryl, 2-0 Vicryl, and a running 4-0 Monocryl suture with  Dermabond glue. Standard dressings were then applied. He was  then placed supine on the bed, extubated, and taken to recovery  room in stable condition.     Electronically signed by Babatunde Villarreal MD on 2/6/2023 at 8:15 PM

## (undated) DEVICE — GLOVE SURG SZ 85 CRM LTX FREE POLYISOPRENE POLYMER BEAD ANTI

## (undated) DEVICE — Device

## (undated) DEVICE — 4.0MM PRECISION ROUND

## (undated) DEVICE — 1010 S-DRAPE TOWEL DRAPE 10/BX: Brand: STERI-DRAPE™

## (undated) DEVICE — GLOVE SURG SZ 85 L12IN FNGR THK79MIL GRN LTX FREE

## (undated) DEVICE — GARMENT,MEDLINE,DVT,INT,CALF,MED, GEN2: Brand: MEDLINE

## (undated) DEVICE — SPONGE,NEURO,1"X1",XR,STRL,LF,10/PK: Brand: MEDLINE

## (undated) DEVICE — INSERT CUSHION HEAD PRONEVIEW

## (undated) DEVICE — C-ARM: Brand: UNBRANDED

## (undated) DEVICE — ADHESIVE SKIN CLOSURE TOP 36 CC HI VISC DERMBND MINI

## (undated) DEVICE — SUTURE MCRYL SZ 4-0 L27IN ABSRB UD L19MM PS-2 1/2 CIR PRIM Y426H

## (undated) DEVICE — SUTURE VCRL SZ 0 L36IN ABSRB UD L36MM CT-1 1/2 CIR J946H

## (undated) DEVICE — CORD,CAUTERY,BIPOLAR,STERILE: Brand: MEDLINE

## (undated) DEVICE — JCKSON TBL POSTNER NO HD REST: Brand: MEDLINE INDUSTRIES, INC.

## (undated) DEVICE — SUTURE VCRL SZ 2-0 L36IN ABSRB UD L36MM CT-1 1/2 CIR J945H

## (undated) DEVICE — GAUZE, BORDER, 3"X6", 1.5"X4"PAD, STERIL: Brand: MEDLINE INDUSTRIES, INC.

## (undated) DEVICE — BLANKET WRM W29.9XL79.1IN UP BODY FORC AIR MISTRAL-AIR

## (undated) DEVICE — TUBING, SUCTION, 1/4" X 12', STRAIGHT: Brand: MEDLINE